# Patient Record
Sex: MALE | Race: WHITE | NOT HISPANIC OR LATINO | Employment: FULL TIME | URBAN - METROPOLITAN AREA
[De-identification: names, ages, dates, MRNs, and addresses within clinical notes are randomized per-mention and may not be internally consistent; named-entity substitution may affect disease eponyms.]

---

## 2024-03-06 ENCOUNTER — HOSPITAL ENCOUNTER (OUTPATIENT)
Facility: HOSPITAL | Age: 64
Discharge: HOME | End: 2024-03-08
Attending: EMERGENCY MEDICINE | Admitting: HOSPITALIST
Payer: COMMERCIAL

## 2024-03-06 DIAGNOSIS — E66.01 MORBID OBESITY: ICD-10-CM

## 2024-03-06 DIAGNOSIS — R42 VERTIGO: ICD-10-CM

## 2024-03-06 DIAGNOSIS — R07.9 CHEST PAIN: ICD-10-CM

## 2024-03-06 DIAGNOSIS — R42 DIZZINESS: ICD-10-CM

## 2024-03-06 DIAGNOSIS — R11.2 INTRACTABLE NAUSEA AND VOMITING: Primary | ICD-10-CM

## 2024-03-06 LAB
ALBUMIN SERPL BCP-MCNC: 3.9 G/DL (ref 3.5–5.2)
ALP SERPL-CCNC: 62 U/L (ref 55–135)
ALT SERPL W/O P-5'-P-CCNC: 26 U/L (ref 10–44)
ANION GAP SERPL CALC-SCNC: 9 MMOL/L (ref 8–16)
AST SERPL-CCNC: 26 U/L (ref 10–40)
BASOPHILS # BLD AUTO: 0.02 K/UL (ref 0–0.2)
BASOPHILS NFR BLD: 0.3 % (ref 0–1.9)
BILIRUB SERPL-MCNC: 0.7 MG/DL (ref 0.1–1)
BUN SERPL-MCNC: 13 MG/DL (ref 8–23)
CALCIUM SERPL-MCNC: 8.8 MG/DL (ref 8.7–10.5)
CHLORIDE SERPL-SCNC: 108 MMOL/L (ref 95–110)
CO2 SERPL-SCNC: 22 MMOL/L (ref 23–29)
CREAT SERPL-MCNC: 1 MG/DL (ref 0.5–1.4)
DIFFERENTIAL METHOD BLD: ABNORMAL
EOSINOPHIL # BLD AUTO: 0 K/UL (ref 0–0.5)
EOSINOPHIL NFR BLD: 0.3 % (ref 0–8)
ERYTHROCYTE [DISTWIDTH] IN BLOOD BY AUTOMATED COUNT: 12.2 % (ref 11.5–14.5)
EST. GFR  (NO RACE VARIABLE): >60 ML/MIN/1.73 M^2
GLUCOSE SERPL-MCNC: 125 MG/DL (ref 70–110)
HCT VFR BLD AUTO: 45.1 % (ref 40–54)
HGB BLD-MCNC: 15.8 G/DL (ref 14–18)
IMM GRANULOCYTES # BLD AUTO: 0.02 K/UL (ref 0–0.04)
IMM GRANULOCYTES NFR BLD AUTO: 0.3 % (ref 0–0.5)
LYMPHOCYTES # BLD AUTO: 0.4 K/UL (ref 1–4.8)
LYMPHOCYTES NFR BLD: 7.2 % (ref 18–48)
MCH RBC QN AUTO: 31.5 PG (ref 27–31)
MCHC RBC AUTO-ENTMCNC: 35 G/DL (ref 32–36)
MCV RBC AUTO: 90 FL (ref 82–98)
MONOCYTES # BLD AUTO: 0.2 K/UL (ref 0.3–1)
MONOCYTES NFR BLD: 3 % (ref 4–15)
NEUTROPHILS # BLD AUTO: 5.3 K/UL (ref 1.8–7.7)
NEUTROPHILS NFR BLD: 88.9 % (ref 38–73)
NRBC BLD-RTO: 0 /100 WBC
PLATELET # BLD AUTO: 165 K/UL (ref 150–450)
PMV BLD AUTO: 9.2 FL (ref 9.2–12.9)
POCT GLUCOSE: 114 MG/DL (ref 70–110)
POTASSIUM SERPL-SCNC: 4.3 MMOL/L (ref 3.5–5.1)
PROT SERPL-MCNC: 7.2 G/DL (ref 6–8.4)
RBC # BLD AUTO: 5.01 M/UL (ref 4.6–6.2)
SODIUM SERPL-SCNC: 139 MMOL/L (ref 136–145)
TROPONIN I SERPL DL<=0.01 NG/ML-MCNC: <0.006 NG/ML (ref 0–0.03)
WBC # BLD AUTO: 5.94 K/UL (ref 3.9–12.7)

## 2024-03-06 PROCEDURE — G0378 HOSPITAL OBSERVATION PER HR: HCPCS

## 2024-03-06 PROCEDURE — 96365 THER/PROPH/DIAG IV INF INIT: CPT

## 2024-03-06 PROCEDURE — 99285 EMERGENCY DEPT VISIT HI MDM: CPT | Mod: 25

## 2024-03-06 PROCEDURE — 84484 ASSAY OF TROPONIN QUANT: CPT | Performed by: EMERGENCY MEDICINE

## 2024-03-06 PROCEDURE — 80053 COMPREHEN METABOLIC PANEL: CPT | Performed by: EMERGENCY MEDICINE

## 2024-03-06 PROCEDURE — 85025 COMPLETE CBC W/AUTO DIFF WBC: CPT | Performed by: EMERGENCY MEDICINE

## 2024-03-06 PROCEDURE — 82962 GLUCOSE BLOOD TEST: CPT

## 2024-03-06 PROCEDURE — 93005 ELECTROCARDIOGRAM TRACING: CPT

## 2024-03-06 PROCEDURE — 63600175 PHARM REV CODE 636 W HCPCS: Performed by: EMERGENCY MEDICINE

## 2024-03-06 PROCEDURE — 96375 TX/PRO/DX INJ NEW DRUG ADDON: CPT

## 2024-03-06 PROCEDURE — 96361 HYDRATE IV INFUSION ADD-ON: CPT

## 2024-03-06 PROCEDURE — 93010 ELECTROCARDIOGRAM REPORT: CPT | Mod: ,,, | Performed by: INTERNAL MEDICINE

## 2024-03-06 PROCEDURE — 25000003 PHARM REV CODE 250: Performed by: EMERGENCY MEDICINE

## 2024-03-06 RX ORDER — LOSARTAN POTASSIUM 50 MG/1
TABLET ORAL
COMMUNITY

## 2024-03-06 RX ORDER — MECLIZINE HYDROCHLORIDE 25 MG/1
25 TABLET ORAL
Status: COMPLETED | OUTPATIENT
Start: 2024-03-06 | End: 2024-03-06

## 2024-03-06 RX ORDER — SODIUM CHLORIDE 0.9 % (FLUSH) 0.9 %
3 SYRINGE (ML) INJECTION EVERY 12 HOURS PRN
Status: DISCONTINUED | OUTPATIENT
Start: 2024-03-06 | End: 2024-03-08 | Stop reason: HOSPADM

## 2024-03-06 RX ORDER — ALUMINUM HYDROXIDE, MAGNESIUM HYDROXIDE, AND SIMETHICONE 1200; 120; 1200 MG/30ML; MG/30ML; MG/30ML
30 SUSPENSION ORAL 4 TIMES DAILY PRN
Status: DISCONTINUED | OUTPATIENT
Start: 2024-03-06 | End: 2024-03-08 | Stop reason: HOSPADM

## 2024-03-06 RX ORDER — ALBUTEROL SULFATE 0.83 MG/ML
SOLUTION RESPIRATORY (INHALATION)
COMMUNITY

## 2024-03-06 RX ORDER — LEVOTHYROXINE SODIUM 150 UG/1
150 TABLET ORAL
Status: DISCONTINUED | OUTPATIENT
Start: 2024-03-07 | End: 2024-03-08 | Stop reason: HOSPADM

## 2024-03-06 RX ORDER — SIMETHICONE 80 MG
1 TABLET,CHEWABLE ORAL 4 TIMES DAILY PRN
Status: DISCONTINUED | OUTPATIENT
Start: 2024-03-06 | End: 2024-03-08 | Stop reason: HOSPADM

## 2024-03-06 RX ORDER — GLUCAGON 1 MG
1 KIT INJECTION
Status: DISCONTINUED | OUTPATIENT
Start: 2024-03-06 | End: 2024-03-08 | Stop reason: HOSPADM

## 2024-03-06 RX ORDER — ATORVASTATIN CALCIUM 20 MG/1
TABLET, FILM COATED ORAL
COMMUNITY

## 2024-03-06 RX ORDER — ATORVASTATIN CALCIUM 10 MG/1
20 TABLET, FILM COATED ORAL NIGHTLY
Status: DISCONTINUED | OUTPATIENT
Start: 2024-03-07 | End: 2024-03-08 | Stop reason: HOSPADM

## 2024-03-06 RX ORDER — ACETAMINOPHEN 650 MG/1
650 SUPPOSITORY RECTAL EVERY 4 HOURS PRN
Status: DISCONTINUED | OUTPATIENT
Start: 2024-03-06 | End: 2024-03-08 | Stop reason: HOSPADM

## 2024-03-06 RX ORDER — TALC
6 POWDER (GRAM) TOPICAL NIGHTLY PRN
Status: DISCONTINUED | OUTPATIENT
Start: 2024-03-06 | End: 2024-03-08 | Stop reason: HOSPADM

## 2024-03-06 RX ORDER — ACETAMINOPHEN 325 MG/1
650 TABLET ORAL EVERY 6 HOURS PRN
Status: DISCONTINUED | OUTPATIENT
Start: 2024-03-06 | End: 2024-03-08 | Stop reason: HOSPADM

## 2024-03-06 RX ORDER — NALOXONE HCL 0.4 MG/ML
0.02 VIAL (ML) INJECTION
Status: DISCONTINUED | OUTPATIENT
Start: 2024-03-06 | End: 2024-03-08 | Stop reason: HOSPADM

## 2024-03-06 RX ORDER — ONDANSETRON HYDROCHLORIDE 2 MG/ML
4 INJECTION, SOLUTION INTRAVENOUS EVERY 6 HOURS PRN
Status: DISCONTINUED | OUTPATIENT
Start: 2024-03-06 | End: 2024-03-08 | Stop reason: HOSPADM

## 2024-03-06 RX ORDER — IBUPROFEN 200 MG
24 TABLET ORAL
Status: DISCONTINUED | OUTPATIENT
Start: 2024-03-06 | End: 2024-03-08 | Stop reason: HOSPADM

## 2024-03-06 RX ORDER — IBUPROFEN 200 MG
16 TABLET ORAL
Status: DISCONTINUED | OUTPATIENT
Start: 2024-03-06 | End: 2024-03-08 | Stop reason: HOSPADM

## 2024-03-06 RX ORDER — PROMETHAZINE HYDROCHLORIDE 25 MG/1
25 TABLET ORAL EVERY 6 HOURS PRN
Status: DISCONTINUED | OUTPATIENT
Start: 2024-03-06 | End: 2024-03-08 | Stop reason: HOSPADM

## 2024-03-06 RX ORDER — LEVOTHYROXINE SODIUM 150 UG/1
TABLET ORAL
COMMUNITY
Start: 2024-01-07

## 2024-03-06 RX ORDER — METHYLPREDNISOLONE SOD SUCC 125 MG
125 VIAL (EA) INJECTION
Status: COMPLETED | OUTPATIENT
Start: 2024-03-06 | End: 2024-03-06

## 2024-03-06 RX ORDER — POLYETHYLENE GLYCOL 3350 17 G/17G
17 POWDER, FOR SOLUTION ORAL DAILY PRN
Status: DISCONTINUED | OUTPATIENT
Start: 2024-03-06 | End: 2024-03-08 | Stop reason: HOSPADM

## 2024-03-06 RX ORDER — LOSARTAN POTASSIUM 50 MG/1
50 TABLET ORAL DAILY
Status: DISCONTINUED | OUTPATIENT
Start: 2024-03-07 | End: 2024-03-08 | Stop reason: HOSPADM

## 2024-03-06 RX ADMIN — MECLIZINE HYDROCHLORIDE 25 MG: 25 TABLET ORAL at 03:03

## 2024-03-06 RX ADMIN — METHYLPREDNISOLONE SODIUM SUCCINATE 125 MG: 125 INJECTION, POWDER, FOR SOLUTION INTRAMUSCULAR; INTRAVENOUS at 06:03

## 2024-03-06 RX ADMIN — PROMETHAZINE HYDROCHLORIDE 25 MG: 25 INJECTION INTRAMUSCULAR; INTRAVENOUS at 05:03

## 2024-03-06 RX ADMIN — SODIUM CHLORIDE 1000 ML: 9 INJECTION, SOLUTION INTRAVENOUS at 05:03

## 2024-03-06 NOTE — ED NOTES
"Patient attempted to stand for orthostatic vitals, Patient stated "I cannot stand I get to dizzy"  "

## 2024-03-06 NOTE — ED PROVIDER NOTES
SCRIBE #1 NOTE: I, Rufino Vides, am scribing for, and in the presence of, Ingrid Vidal MD. I have scribed the entire note.       History     Chief Complaint   Patient presents with    Dizziness     Pt. Presents to ED via EMS due to being DX with vertigo this morning at , and having vomiting with any movement      Review of patient's allergies indicates:  No Known Allergies      History of Present Illness     HPI    3/6/2024, 1:27 PM  History obtained from the patient and EMS      History of Present Illness: Kamar Loya is a 63 y.o. male patient with a PMHx of hypothyroidism who presents to the Emergency Department for evaluation of dizziness which onset last night. Associated sxs include N/V and tinnitus. He notes that the vomiting worsens with movement. Patient denies any CP, SOB, HA, vision changes, and all other sxs at this time. He was treated at  earlier today with meclizine and zofran with some improvement with some improvement but was referred to the ED. Shelli, pt received 2.5 mg droperidol, zofran, and NS with improvement; he denied nausea on arrival. He reports a hx of vertigo with similar sxs when he was in college. 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 No further complaints or concerns at this time.       Arrival mode: EMS    PCP: No primary care provider on file.        Past Medical History:  Past Medical History:   Diagnosis Date    Hypertension     Hypothyroidism, unspecified        Past Surgical History:  History reviewed. No pertinent surgical history.      Family History:  History reviewed. No pertinent family history.    Social History:  Social History     Tobacco Use    Smoking status: Never    Smokeless tobacco: Never   Substance and Sexual Activity    Alcohol use: Never    Drug use: Never    Sexual activity: Not on file        Review of Systems     Review of Systems   Constitutional:  Negative for fever.   HENT:  Positive for tinnitus. Negative for sore throat.    Eyes:  Negative for visual  disturbance.   Respiratory:  Negative for shortness of breath.    Cardiovascular:  Negative for chest pain.   Gastrointestinal:  Positive for nausea and vomiting.   Genitourinary:  Negative for dysuria.   Musculoskeletal:  Positive for gait problem. Negative for back pain.   Skin:  Negative for rash.   Neurological:  Positive for dizziness. Negative for weakness and headaches.   Hematological:  Does not bruise/bleed easily.   All other systems reviewed and are negative.     Physical Exam     Initial Vitals [03/06/24 1330]   BP Pulse Resp Temp SpO2   123/83 74 16 98.4 °F (36.9 °C) 98 %      MAP       --          Physical Exam  Nursing Notes and Vital Signs Reviewed.  Constitutional: Patient is in no acute distress. Well-developed and well-nourished.  Head: Atraumatic. Normocephalic.  Eyes: R pupil RRL. EOM intact. L eye prosthesis. Conjunctivae are not pale. No scleral icterus.  ENT: Mucous membranes are moist. Oropharynx is clear and symmetric.    Neck: Supple. Full ROM. No lymphadenopathy.  Cardiovascular: Regular rate. Regular rhythm. No murmurs, rubs, or gallops. Distal pulses are 2+ and symmetric.  Pulmonary/Chest: No respiratory distress. Clear to auscultation bilaterally. No wheezing or rales.  Abdominal: Soft and non-distended.  There is no tenderness.  No rebound, guarding, or rigidity. Good bowel sounds.  Genitourinary: No CVA tenderness  Musculoskeletal: Moves all extremities. No obvious deformities. No edema. No calf tenderness.  Skin: Warm and dry.  Neurological:  Alert, awake, and appropriate.  Normal speech.  No acute focal neurological deficits are appreciated.  Psychiatric: Normal affect. Good eye contact. Appropriate in content.     ED Course   Procedures  ED Vital Signs:  Vitals:    03/07/24 0438 03/07/24 0757 03/07/24 0809 03/07/24 0907   BP: 130/72  (!) 141/78 (!) 141/78   Pulse: 81 82 76    Resp: 17  18    Temp: 97.5 °F (36.4 °C)  97.7 °F (36.5 °C)    TempSrc: Oral      SpO2: (!) 94%  (!) 93%     Weight:       Height:        03/07/24 1126 03/07/24 1640 03/07/24 2000 03/07/24 2049   BP: (!) 169/80 136/73  (!) 149/85   Pulse: 73 82 74 66   Resp: 20 19  18   Temp: 98.2 °F (36.8 °C) 98.1 °F (36.7 °C)  98.3 °F (36.8 °C)   TempSrc: Oral Oral  Oral   SpO2: (!) 93% (!) 92%  (!) 93%   Weight:       Height:        03/08/24 0014 03/08/24 0032 03/08/24 0335 03/08/24 0400   BP: 113/61  114/66    Pulse: 71  64 61   Resp: 18  17    Temp: 98 °F (36.7 °C)      TempSrc: Oral  Oral    SpO2: (!) 91% (!) 94% (!) 93%    Weight:       Height:        03/08/24 0737 03/08/24 0820 03/08/24 0908   BP: (!) 140/78  (!) 140/78   Pulse: 75 70    Resp: 12     Temp: 97.6 °F (36.4 °C)     TempSrc: Oral     SpO2: 95%     Weight:      Height:          Abnormal Lab Results:  Labs Reviewed   CBC W/ AUTO DIFFERENTIAL - Abnormal; Notable for the following components:       Result Value    MCH 31.5 (*)     Lymph # 0.4 (*)     Mono # 0.2 (*)     Gran % 88.9 (*)     Lymph % 7.2 (*)     Mono % 3.0 (*)     All other components within normal limits   COMPREHENSIVE METABOLIC PANEL - Abnormal; Notable for the following components:    CO2 22 (*)     Glucose 125 (*)     All other components within normal limits   POCT GLUCOSE - Abnormal; Notable for the following components:    POCT Glucose 114 (*)     All other components within normal limits   TROPONIN I        All Lab Results:  Results for orders placed or performed during the hospital encounter of 03/06/24   CBC auto differential   Result Value Ref Range    WBC 5.94 3.90 - 12.70 K/uL    RBC 5.01 4.60 - 6.20 M/uL    Hemoglobin 15.8 14.0 - 18.0 g/dL    Hematocrit 45.1 40.0 - 54.0 %    MCV 90 82 - 98 fL    MCH 31.5 (H) 27.0 - 31.0 pg    MCHC 35.0 32.0 - 36.0 g/dL    RDW 12.2 11.5 - 14.5 %    Platelets 165 150 - 450 K/uL    MPV 9.2 9.2 - 12.9 fL    Immature Granulocytes 0.3 0.0 - 0.5 %    Gran # (ANC) 5.3 1.8 - 7.7 K/uL    Immature Grans (Abs) 0.02 0.00 - 0.04 K/uL    Lymph # 0.4 (L) 1.0 - 4.8 K/uL    Mono  # 0.2 (L) 0.3 - 1.0 K/uL    Eos # 0.0 0.0 - 0.5 K/uL    Baso # 0.02 0.00 - 0.20 K/uL    nRBC 0 0 /100 WBC    Gran % 88.9 (H) 38.0 - 73.0 %    Lymph % 7.2 (L) 18.0 - 48.0 %    Mono % 3.0 (L) 4.0 - 15.0 %    Eosinophil % 0.3 0.0 - 8.0 %    Basophil % 0.3 0.0 - 1.9 %    Differential Method Automated    Comprehensive metabolic panel   Result Value Ref Range    Sodium 139 136 - 145 mmol/L    Potassium 4.3 3.5 - 5.1 mmol/L    Chloride 108 95 - 110 mmol/L    CO2 22 (L) 23 - 29 mmol/L    Glucose 125 (H) 70 - 110 mg/dL    BUN 13 8 - 23 mg/dL    Creatinine 1.0 0.5 - 1.4 mg/dL    Calcium 8.8 8.7 - 10.5 mg/dL    Total Protein 7.2 6.0 - 8.4 g/dL    Albumin 3.9 3.5 - 5.2 g/dL    Total Bilirubin 0.7 0.1 - 1.0 mg/dL    Alkaline Phosphatase 62 55 - 135 U/L    AST 26 10 - 40 U/L    ALT 26 10 - 44 U/L    eGFR >60 >60 mL/min/1.73 m^2    Anion Gap 9 8 - 16 mmol/L   Troponin I #1   Result Value Ref Range    Troponin I <0.006 0.000 - 0.026 ng/mL   Vitamin B12   Result Value Ref Range    Vitamin B-12 418 210 - 950 pg/mL   Hemoglobin A1c   Result Value Ref Range    Hemoglobin A1C 5.6 4.0 - 5.6 %    Estimated Avg Glucose 114 68 - 131 mg/dL   TSH   Result Value Ref Range    TSH 0.451 0.400 - 4.000 uIU/mL   T4, free   Result Value Ref Range    Free T4 1.06 0.71 - 1.51 ng/dL   CBC Auto Differential   Result Value Ref Range    WBC 6.95 3.90 - 12.70 K/uL    RBC 4.65 4.60 - 6.20 M/uL    Hemoglobin 14.7 14.0 - 18.0 g/dL    Hematocrit 43.0 40.0 - 54.0 %    MCV 93 82 - 98 fL    MCH 31.6 (H) 27.0 - 31.0 pg    MCHC 34.2 32.0 - 36.0 g/dL    RDW 12.4 11.5 - 14.5 %    Platelets 186 150 - 450 K/uL    MPV 9.3 9.2 - 12.9 fL    Immature Granulocytes 0.3 0.0 - 0.5 %    Gran # (ANC) 5.1 1.8 - 7.7 K/uL    Immature Grans (Abs) 0.02 0.00 - 0.04 K/uL    Lymph # 1.3 1.0 - 4.8 K/uL    Mono # 0.5 0.3 - 1.0 K/uL    Eos # 0.0 0.0 - 0.5 K/uL    Baso # 0.04 0.00 - 0.20 K/uL    nRBC 0 0 /100 WBC    Gran % 73.0 38.0 - 73.0 %    Lymph % 19.3 18.0 - 48.0 %    Mono % 6.5 4.0 -  15.0 %    Eosinophil % 0.3 0.0 - 8.0 %    Basophil % 0.6 0.0 - 1.9 %    Differential Method Automated    Basic Metabolic Panel   Result Value Ref Range    Sodium 139 136 - 145 mmol/L    Potassium 4.1 3.5 - 5.1 mmol/L    Chloride 107 95 - 110 mmol/L    CO2 25 23 - 29 mmol/L    Glucose 118 (H) 70 - 110 mg/dL    BUN 16 8 - 23 mg/dL    Creatinine 1.1 0.5 - 1.4 mg/dL    Calcium 8.8 8.7 - 10.5 mg/dL    Anion Gap 7 (L) 8 - 16 mmol/L    eGFR >60 >60 mL/min/1.73 m^2   EKG 12-lead   Result Value Ref Range    QRS Duration 98 ms    [facility] QTC Calculation 448 ms   POCT glucose   Result Value Ref Range    POCT Glucose 114 (H) 70 - 110 mg/dL        Imaging Results:  Imaging Results              MRI Brain Without Contrast (Final result)  Result time 03/06/24 21:03:29      Final result by Aggie Chauhan MD (03/06/24 21:03:29)                   Impression:      no acute intracranial finding      Electronically signed by: Aggie Chauhan  Date:    03/06/2024  Time:    21:03               Narrative:    EXAMINATION:  MRI BRAIN WITHOUT CONTRAST    CLINICAL HISTORY:  Dizziness, persistent/recurrent, cardiac or vascular cause suspected;    TECHNIQUE:  Multiplanar multisequence MR imaging of the brain was performed without contrast.    COMPARISON:  Head CT correlation    FINDINGS:  No acute ischemia    No mass effect or midline shift.    No hydrocephalus.    Right mastoid air cell effusion    Major vascular flow voids present    Sinus opacity redemonstrated                                       CT Head Without Contrast (Final result)  Result time 03/06/24 17:11:26      Final result by Aggie Chauhan MD (03/06/24 17:11:26)                   Impression:      No acute intracranial finding      Electronically signed by: Aggie Chauhan  Date:    03/06/2024  Time:    17:11               Narrative:    EXAMINATION:  CT HEAD WITHOUT CONTRAST    CLINICAL HISTORY:  Dizziness, persistent/recurrent, cardiac or vascular cause  suspected;    TECHNIQUE:  Low dose axial images were obtained through the head.  Coronal and sagittal reformations were also performed. Contrast was not administered.    COMPARISON:  None available    FINDINGS:  No acute intracranial hemorrhage or mass effect.  Ventricles nondistended.  No displaced skull fracture.  Mastoid effusion right.  Maxillary sinus opacity bilateral appears chronic                                       The EKG was ordered, reviewed, and independently interpreted by the ED provider.  Interpretation time: 16:03  Rate: 66 BPM  Rhythm: normal sinus rhythm  Interpretation: Left axis deviation. Incomplete right bundle branch block. No STEMI.             The Emergency Provider reviewed the vital signs and test results, which are outlined above.     ED Discussion       7:10 PM: Discussed case with Marylu Adames MD ([facility]). Dr. Mina agrees with current care and management of pt and accepts admission.   Admitting Service: Hospital Medicine  Admitting Physician: Dr. Mina  Admit to: Obs Med/Tele    7:10 PM: Re-evaluated pt. I have discussed test results, shared treatment plan, and the need for admission with patient and family at bedside. Pt and family express understanding at this time and agree with all information. All questions answered. Pt and family have no further questions or concerns at this time. Pt is ready for admit.         Medical Decision Making  DDX:  1. Vertigo  2. BPPV  3. Stroke      Presents with sudden onset vertigo, nausea vomiting, unable to stand, neuro exam otherwise normal, CT head negative, ECG no ischemic changes, lab work reviewed and otherwise normal, patient given meclizine, phenergan and iv fluids, he remains symptomatic and unable to stand, hosp med consulted for admission.     Amount and/or Complexity of Data Reviewed  Labs: ordered. Decision-making details documented in ED Course.  Radiology: ordered. Decision-making details documented in ED  Course.  ECG/medicine tests: ordered and independent interpretation performed. Decision-making details documented in ED Course.    Risk  Prescription drug management.  Decision regarding hospitalization.                ED Medication(s):  Medications   meclizine tablet 25 mg (25 mg Oral Given 3/6/24 1544)   promethazine (PHENERGAN) 25 mg in dextrose 5 % (D5W) 50 mL IVPB (0 mg Intravenous Stopped 3/6/24 1733)   sodium chloride 0.9% bolus 1,000 mL 1,000 mL (0 mLs Intravenous Stopped 3/6/24 1813)   methylPREDNISolone sodium succinate injection 125 mg (125 mg Intravenous Given by Other 3/6/24 1815)   ondansetron disintegrating tablet 4 mg (4 mg Oral Given 3/8/24 0008)   clonazePAM tablet 0.5 mg (0.5 mg Oral Given 3/7/24 1241)       Discharge Medication List as of 3/8/2024 12:38 PM        START taking these medications    Details   clonazePAM (KLONOPIN) 0.5 MG tablet Take 1 tablet (0.5 mg total) by mouth 2 (two) times daily as needed (vertigo)., Starting Fri 3/8/2024, Until Sat 3/8/2025 at 2359, Normal      ondansetron (ZOFRAN-ODT) 4 MG TbDL Dissolve 2 tablets (8 mg total) by mouth every 6 (six) hours as needed (nausea)., Starting Fri 3/8/2024, Normal              Follow-up Information       [facility] Follow up.                                 Scribe Attestation:   Scribe #1: I performed the above scribed service and the documentation accurately describes the services I performed. I attest to the accuracy of the note.     Attending:   Physician Attestation Statement for Scribe #1: I, Ingrid Vidal MD, personally performed the services described in this documentation, as scribed by Rufino Vides, in my presence, and it is both accurate and complete.           Clinical Impression       ICD-10-CM ICD-9-CM   1. Intractable nausea and vomiting  R11.2 536.2   2. Dizziness  R42 780.4   3. Morbid obesity  E66.01 278.01   4. Chest pain  R07.9 786.50   5. Vertigo  R42 780.4       Disposition:   Disposition:  Placed in Observation  Condition: Ingrid Sanchez MD  03/09/24 1501

## 2024-03-07 PROBLEM — I10 HYPERTENSION: Status: ACTIVE | Noted: 2024-03-07

## 2024-03-07 PROBLEM — E03.9 HYPOTHYROIDISM: Status: ACTIVE | Noted: 2024-03-07

## 2024-03-07 PROBLEM — H61.23 IMPACTED CERUMEN OF BOTH EARS: Status: ACTIVE | Noted: 2024-03-07

## 2024-03-07 PROBLEM — R42 DIZZINESS: Status: ACTIVE | Noted: 2024-03-07

## 2024-03-07 LAB
ESTIMATED AVG GLUCOSE: 114 MG/DL (ref 68–131)
HBA1C MFR BLD: 5.6 % (ref 4–5.6)
T4 FREE SERPL-MCNC: 1.06 NG/DL (ref 0.71–1.51)
TSH SERPL DL<=0.005 MIU/L-ACNC: 0.45 UIU/ML (ref 0.4–4)
VIT B12 SERPL-MCNC: 418 PG/ML (ref 210–950)

## 2024-03-07 PROCEDURE — 84443 ASSAY THYROID STIM HORMONE: CPT | Performed by: INTERNAL MEDICINE

## 2024-03-07 PROCEDURE — 83036 HEMOGLOBIN GLYCOSYLATED A1C: CPT | Performed by: INTERNAL MEDICINE

## 2024-03-07 PROCEDURE — 36415 COLL VENOUS BLD VENIPUNCTURE: CPT | Performed by: INTERNAL MEDICINE

## 2024-03-07 PROCEDURE — 97165 OT EVAL LOW COMPLEX 30 MIN: CPT

## 2024-03-07 PROCEDURE — 97116 GAIT TRAINING THERAPY: CPT

## 2024-03-07 PROCEDURE — 97530 THERAPEUTIC ACTIVITIES: CPT | Mod: 59

## 2024-03-07 PROCEDURE — 82607 VITAMIN B-12: CPT | Performed by: INTERNAL MEDICINE

## 2024-03-07 PROCEDURE — 95992 CANALITH REPOSITIONING PROC: CPT

## 2024-03-07 PROCEDURE — G0378 HOSPITAL OBSERVATION PER HR: HCPCS

## 2024-03-07 PROCEDURE — 25000003 PHARM REV CODE 250: Performed by: NURSE PRACTITIONER

## 2024-03-07 PROCEDURE — 84439 ASSAY OF FREE THYROXINE: CPT | Performed by: INTERNAL MEDICINE

## 2024-03-07 PROCEDURE — 97530 THERAPEUTIC ACTIVITIES: CPT

## 2024-03-07 PROCEDURE — 97162 PT EVAL MOD COMPLEX 30 MIN: CPT

## 2024-03-07 RX ORDER — CLONAZEPAM 0.5 MG/1
0.5 TABLET ORAL 2 TIMES DAILY PRN
Status: DISCONTINUED | OUTPATIENT
Start: 2024-03-07 | End: 2024-03-08 | Stop reason: HOSPADM

## 2024-03-07 RX ORDER — ONDANSETRON 4 MG/1
4 TABLET, ORALLY DISINTEGRATING ORAL EVERY 6 HOURS
Status: COMPLETED | OUTPATIENT
Start: 2024-03-07 | End: 2024-03-08

## 2024-03-07 RX ORDER — CLONAZEPAM 0.5 MG/1
0.5 TABLET ORAL ONCE
Status: COMPLETED | OUTPATIENT
Start: 2024-03-07 | End: 2024-03-07

## 2024-03-07 RX ADMIN — LEVOTHYROXINE SODIUM 150 MCG: 150 TABLET ORAL at 06:03

## 2024-03-07 RX ADMIN — ONDANSETRON 4 MG: 4 TABLET, ORALLY DISINTEGRATING ORAL at 06:03

## 2024-03-07 RX ADMIN — LOSARTAN POTASSIUM 50 MG: 50 TABLET, FILM COATED ORAL at 09:03

## 2024-03-07 RX ADMIN — ACETAMINOPHEN 650 MG: 325 TABLET ORAL at 08:03

## 2024-03-07 RX ADMIN — Medication 6 MG: at 08:03

## 2024-03-07 RX ADMIN — CLONAZEPAM 0.5 MG: 0.5 TABLET ORAL at 12:03

## 2024-03-07 RX ADMIN — ATORVASTATIN CALCIUM 20 MG: 10 TABLET, FILM COATED ORAL at 08:03

## 2024-03-07 RX ADMIN — ONDANSETRON 4 MG: 4 TABLET, ORALLY DISINTEGRATING ORAL at 12:03

## 2024-03-07 NOTE — PT/OT/SLP EVAL
Occupational Therapy Evaluation and Treatment    Name: Kamar Loya  MRN: [id number]  Admitting Diagnosis: Dizziness  Recent Surgery: * No surgery found *      Recommendations:     Discharge Recommendations: Low Intensity Therapy  Level of Assistance Recommended: 24 hours light assistance  Discharge Equipment Recommendations: to be determined by next level of care  Barriers to discharge:      Assessment:     Kamar Loya is a 63 y.o. male with a medical diagnosis of Dizziness. He presents with performance deficits affecting function including weakness, impaired functional mobility, decreased safety awareness, impaired endurance, decreased coordination, gait instability, impaired balance, impaired self care skills, decreased lower extremity function, impaired coordination, visual deficits.     Rehab Prognosis: Good; patient would benefit from acute OT services to address these deficits and reach maximum level of function.    Plan:     Patient to be seen   to address the above listed problems via self-care/home management, therapeutic activities, therapeutic exercises  Plan of Care Expires: 03/21/24  Plan of Care Reviewed with: patient    Subjective     Chief Complaint: DEBILITY AND GENERALIZED WEAKNESS  Patient Comments/Goals:   Pain/Comfort:  Pain Rating 1: 0/10    Patients cultural, spiritual, Catholic conflicts given the current situation:      Social History:  Living Environment: Patient lives alone in a single story home with number of outside stair(s): 0  Prior Level of Function: Prior to admission, patient was independent  Roles and Routines: Patient was driving and working prior to admission.  Equipment Used at Home: none  DME owned (not currently used): none  Assistance Upon Discharge: unknown    Objective:     Communicated with NURSE AND EPIC CHART REVIEW prior to session. Patient found up in chair with peripheral IV upon OT entry to room.    General Precautions: Standard, fall   Orthopedic  Precautions: N/A   Braces: N/A    Respiratory Status: Room air    Occupational Performance    Gait belt applied - Yes    Bed Mobility:   Rolling/Turning to Left with stand by assistance  Rolling/Turning to Right with stand by assistance  Scooting anteriorly to EOB to have both feet planted on floor: stand by assistance  Supine to sit from right side of bed with stand by assistance  Supine to sit from left side of bed with stand by assistance    Functional Mobility/Transfers:  Sit <> Stand Transfer with minimum assistance with rolling walker  Bed <> Chair Transfer using Step Transfer technique with minimum assistance with rolling walker  Functional Mobility: PT AMBULATED 18 FEET WITH ROLLING WALKER MIN A     Activities of Daily Living:  Upper Body Dressing: minimum assistance  Lower Body Dressing: maximal assistance DUE TO PROTOCOL    Cognitive/Visual Perceptual:  Cognitive/Psychosocial Skills:    -     Oriented to: Person, Place, Time, Situation  -     Follows Commands/attention: Follows multistep  commands  -     Communication: clear/fluent  -     Memory: No Deficits noted  -     Safety awareness/insight to disability: impaired    Physical Exam:  Upper Extremity Range of Motion:     -       Right Upper Extremity: WFL  -       Left Upper Extremity: WFL  Upper Extremity Strength:    -       Right Upper Extremity: MMT: 4/5 GROSSLY  -       Left Upper Extremity: MMT: 4/5 GROSSLY   Strength:    -       Right Upper Extremity: WFL  -       Left Upper Extremity: WFL    AMPAC 6 Click ADL:  AMPAC Total Score: 20    Treatment & Education:  Educated patient on importance of increased tolerance to upright position and direct impact on CV endurance and strength. Patient encouraged to sit up in chair/ EOB, for a minimum of 2 consecutive hours including for all meals.. Encouraged patient to perform AROM TE to BUE throughout the day within all available planes of motion. Re enforced importance of utilizing call light to meet  needs in room and not attempt to get up without staff assistance. Patient verbalized understanding and agreed to comply.           Patient clear to stand pivot transfer with RN/PCT, assist xSTEP <PIV OT TRANSFER .    Patient left up in chair with all lines intact, call button in reach, RN notified, and chair alarm on.    GOALS:   Multidisciplinary Problems       Occupational Therapy Goals          Problem: Occupational Therapy    Goal Priority Disciplines Outcome Interventions   Occupational Therapy Goal     OT, PT/OT     Description: O.T. GOALS TO BE MET BY 3-20-24  PT TOLERATED 1 SET X 15 REPS B UE ROM EXERCISE  S WITH LE DRESSING  S WITH UE DRESSING   S WITH TOILET TRANSFER                       History:     Past Medical History:   Diagnosis Date    Hypertension     Hypothyroidism, unspecified        History reviewed. No pertinent surgical history.    Time Tracking:     OT Date of Treatment: 03/07/24  OT Start Time: 1050  OT Stop Time: 1120  OT Total Time (min): 30 min    Billable Minutes: Evaluation 15 MINUTES and Therapeutic Activity 15 MINUTES    3/7/2024

## 2024-03-07 NOTE — PLAN OF CARE
PT TOLERATED EPLEY MANEUVER WITH CONT DIZZINESS AND NAUSEA. PT GT TRAINED X 18' WITH RW AND MIN A.

## 2024-03-07 NOTE — ASSESSMENT & PLAN NOTE
Advanced imaging negative for posterior stroke. Some improvement with meclizine at outside facility and with removal of impacted cerumen.   Plan:  -IVFs prn  -US carotids  -Hold off on additional doses of meclizine until evaluated by PT/OT  -change positions slowly  -OP ENT f/u for further eval/treatment of peripheral vs central causes of dizziness    3/7/24  Carotid doppler negative.  Discussed with ENT who recommended trial of benzodiazepine and continue Epley maneuver via PT/TO  Discussed with patient he will not be able to drive home  at discharge.

## 2024-03-07 NOTE — ASSESSMENT & PLAN NOTE
Chronic, controlled. Latest blood pressure and vitals reviewed-     Temp:  [97.5 °F (36.4 °C)-98.7 °F (37.1 °C)]   Pulse:  [62-75]   Resp:  [16-20]   BP: (118-151)/(74-88)   SpO2:  [93 %-98 %] .   Home meds for hypertension were reviewed and noted below.   Hypertension Medications               losartan (COZAAR) 50 MG tablet             While in the hospital, will manage blood pressure as follows; Continue home antihypertensive regimen    Will utilize p.r.n. blood pressure medication only if patient's blood pressure greater than 160/100 and he develops symptoms such as worsening chest pain or shortness of breath.

## 2024-03-07 NOTE — HPI
Kamar Loya is a 63 y.o. male with a PMH  has a past medical history of Hypertension and Hypothyroidism, unspecified.  Presented to the ER for further evaluation of dizziness with associated nausea and vomiting after being sent from urgent care.  Patient states he was given meclizine and Zofran earlier with some improvement of his symptoms.  Denies history of vertigo, but states he had 1 other episode when he was in college when he was dizzy.  Denies any recent illnesses, sick contacts, causes of his symptoms.  Patient reports staying hydrated and denies any diuretic therapy.  Denies any other associated symptoms such as headache, visual disturbances, chest pain, palpitations, shortness of breath, dyspnea exertion, abdominal pain common bladder/bowel complaints, or any other symptoms at this time.    ER workup has been unremarkable.  CT of the head and MRI of the brain negative for acute findings.  Patient received 25 mg meclizine, 125 mg Solu-Medrol, 25 mg Phenergan, 1 L normal saline in ED. hospital Medicine consulted to admit patient for dizziness/unsteady gait patient in agreement with treatment plan.  Patient will be admitted under observation status for PT/OT eval for vestibular evaluation.    PCP: No primary care provider on file.

## 2024-03-07 NOTE — H&P
[facility] Medicine  History & Physical    Patient Name: Kamar Loya  MRN: [id number]  Patient Class: OP- Observation  Admission Date: 3/6/2024  Attending Physician: Valentin Mina MD   Primary Care Provider: No primary care provider on file.         Patient information was obtained from patient, past medical records, and ER records.     Subjective:     Principal Problem:Dizziness    Chief Complaint:   Chief Complaint   Patient presents with    Dizziness     Pt. Presents to ED via EMS due to being DX with vertigo this morning at , and having vomiting with any movement         HPI: Kamar Loya is a 63 y.o. male with a PMH  has a past medical history of Hypertension and Hypothyroidism, unspecified.  Presented to the ER for further evaluation of dizziness with associated nausea and vomiting after being sent from urgent care.  Patient states he was given meclizine and Zofran earlier with some improvement of his symptoms.  Denies history of vertigo, but states he had 1 other episode when he was in college when he was dizzy.  Denies any recent illnesses, sick contacts, causes of his symptoms.  Patient reports staying hydrated and denies any diuretic therapy.  Denies any other associated symptoms such as headache, visual disturbances, chest pain, palpitations, shortness of breath, dyspnea exertion, abdominal pain common bladder/bowel complaints, or any other symptoms at this time.    ER workup has been unremarkable.  CT of the head and MRI of the brain negative for acute findings.  Patient received 25 mg meclizine, 125 mg Solu-Medrol, 25 mg Phenergan, 1 L normal saline in ED. hospital Medicine consulted to admit patient for dizziness/unsteady gait patient in agreement with treatment plan.  Patient will be admitted under observation status for PT/OT eval for vestibular evaluation.    PCP: No primary care provider on file.    Past Medical History:   Diagnosis Date    Hypertension      Hypothyroidism, unspecified        History reviewed. No pertinent surgical history.    Review of patient's allergies indicates:  No Known Allergies    No current facility-administered medications on file prior to encounter.     Current Outpatient Medications on File Prior to Encounter   Medication Sig    SYNTHROID 150 mcg tablet Take 1 tablet every day by oral route in the morning.    albuterol (PROVENTIL) 2.5 mg /3 mL (0.083 %) nebulizer solution Inhale 3 mL 3 times a day by nebulization route as needed.    atorvastatin (LIPITOR) 20 MG tablet TAKE ONE TABLET BY MOUTH DAILY AT BEDTIME FOR CHOLESTEROL    losartan (COZAAR) 50 MG tablet      Family History    None       Tobacco Use    Smoking status: Never    Smokeless tobacco: Never   Substance and Sexual Activity    Alcohol use: Never    Drug use: Never    Sexual activity: Not on file     Review of Systems   Cardiovascular:  Negative for chest pain and palpitations.   Neurological:  Positive for dizziness and light-headedness. Negative for syncope and headaches.   All other systems reviewed and are negative.    Objective:     Vital Signs (Most Recent):  Temp: 97.5 °F (36.4 °C) (03/06/24 2300)  Pulse: 62 (03/07/24 0146)  Resp: 20 (03/06/24 2300)  BP: (!) 151/84 (03/06/24 2300)  SpO2: (!) 93 % (03/06/24 2300) Vital Signs (24h Range):  Temp:  [97.5 °F (36.4 °C)-98.7 °F (37.1 °C)] 97.5 °F (36.4 °C)  Pulse:  [62-75] 62  Resp:  [16-20] 20  SpO2:  [93 %-98 %] 93 %  BP: (118-151)/(74-88) 151/84     Weight: (!) 138.3 kg (304 lb 14.3 oz)  Body mass index is 39.15 kg/m².     Physical Exam  Vitals and nursing note reviewed.   Constitutional:       General: He is awake. He is not in acute distress.     Appearance: Normal appearance. He is well-developed and well-groomed. He is not ill-appearing, toxic-appearing or diaphoretic.   HENT:      Head: Normocephalic and atraumatic.      Right Ear: There is impacted cerumen.      Left Ear: There is impacted cerumen.      Ears:       Comments: Large amount of black impacted wax that was obscuring view of Bilateral TMs was removed allowing complete visualization of normal appearing TMs bilaterally. Small amount of irritation/blood noted to ear canal after removal of impacted cerumen.   Eyes:      Extraocular Movements: Extraocular movements intact.      Right eye: No nystagmus.      Left eye: No nystagmus.      Conjunctiva/sclera: Conjunctivae normal.      Pupils: Pupils are equal, round, and reactive to light.   Cardiovascular:      Rate and Rhythm: Normal rate and regular rhythm.      Pulses: Normal pulses.      Heart sounds: Normal heart sounds. No murmur heard.  Pulmonary:      Effort: Pulmonary effort is normal.      Breath sounds: Normal breath sounds.   Abdominal:      General: Bowel sounds are normal.      Palpations: Abdomen is soft.      Tenderness: There is no abdominal tenderness.   Musculoskeletal:      Cervical back: Normal range of motion and neck supple.      Comments: 5/5 strength throughout   Skin:     General: Skin is warm and dry.      Capillary Refill: Capillary refill takes less than 2 seconds.   Neurological:      General: No focal deficit present.      Mental Status: He is alert and oriented to person, place, and time. Mental status is at baseline.      GCS: GCS eye subscore is 4. GCS verbal subscore is 5. GCS motor subscore is 6.      Cranial Nerves: Cranial nerves 2-12 are intact.      Sensory: Sensation is intact.      Motor: Motor function is intact.      Deep Tendon Reflexes: Reflexes are normal and symmetric.   Psychiatric:         Mood and Affect: Mood normal.         Behavior: Behavior normal. Behavior is cooperative.              CRANIAL NERVES     CN III, IV, VI   Pupils are equal, round, and reactive to light.     LABS:  Recent Results (from the past 24 hour(s))   CBC auto differential    Collection Time: 03/06/24  3:49 PM   Result Value Ref Range    WBC 5.94 3.90 - 12.70 K/uL    RBC 5.01 4.60 - 6.20 M/uL     Hemoglobin 15.8 14.0 - 18.0 g/dL    Hematocrit 45.1 40.0 - 54.0 %    MCV 90 82 - 98 fL    MCH 31.5 (H) 27.0 - 31.0 pg    MCHC 35.0 32.0 - 36.0 g/dL    RDW 12.2 11.5 - 14.5 %    Platelets 165 150 - 450 K/uL    MPV 9.2 9.2 - 12.9 fL    Immature Granulocytes 0.3 0.0 - 0.5 %    Gran # (ANC) 5.3 1.8 - 7.7 K/uL    Immature Grans (Abs) 0.02 0.00 - 0.04 K/uL    Lymph # 0.4 (L) 1.0 - 4.8 K/uL    Mono # 0.2 (L) 0.3 - 1.0 K/uL    Eos # 0.0 0.0 - 0.5 K/uL    Baso # 0.02 0.00 - 0.20 K/uL    nRBC 0 0 /100 WBC    Gran % 88.9 (H) 38.0 - 73.0 %    Lymph % 7.2 (L) 18.0 - 48.0 %    Mono % 3.0 (L) 4.0 - 15.0 %    Eosinophil % 0.3 0.0 - 8.0 %    Basophil % 0.3 0.0 - 1.9 %    Differential Method Automated    Comprehensive metabolic panel    Collection Time: 03/06/24  3:49 PM   Result Value Ref Range    Sodium 139 136 - 145 mmol/L    Potassium 4.3 3.5 - 5.1 mmol/L    Chloride 108 95 - 110 mmol/L    CO2 22 (L) 23 - 29 mmol/L    Glucose 125 (H) 70 - 110 mg/dL    BUN 13 8 - 23 mg/dL    Creatinine 1.0 0.5 - 1.4 mg/dL    Calcium 8.8 8.7 - 10.5 mg/dL    Total Protein 7.2 6.0 - 8.4 g/dL    Albumin 3.9 3.5 - 5.2 g/dL    Total Bilirubin 0.7 0.1 - 1.0 mg/dL    Alkaline Phosphatase 62 55 - 135 U/L    AST 26 10 - 40 U/L    ALT 26 10 - 44 U/L    eGFR >60 >60 mL/min/1.73 m^2    Anion Gap 9 8 - 16 mmol/L   Troponin I #1    Collection Time: 03/06/24  3:49 PM   Result Value Ref Range    Troponin I <0.006 0.000 - 0.026 ng/mL   POCT glucose    Collection Time: 03/06/24  3:49 PM   Result Value Ref Range    POCT Glucose 114 (H) 70 - 110 mg/dL   EKG 12-lead    Collection Time: 03/06/24  4:03 PM   Result Value Ref Range    QRS Duration 98 ms    [facility] QTC Calculation 448 ms       RADIOLOGY  MRI Brain Without Contrast    Result Date: 3/6/2024  EXAMINATION: MRI BRAIN WITHOUT CONTRAST CLINICAL HISTORY: Dizziness, persistent/recurrent, cardiac or vascular cause suspected; TECHNIQUE: Multiplanar multisequence MR imaging of the brain was performed without contrast.  COMPARISON: Head CT correlation FINDINGS: No acute ischemia No mass effect or midline shift. No hydrocephalus. Right mastoid air cell effusion Major vascular flow voids present Sinus opacity redemonstrated     no acute intracranial finding Electronically signed by: Aggie Chauhan Date:    03/06/2024 Time:    21:03    CT Head Without Contrast    Result Date: 3/6/2024  EXAMINATION: CT HEAD WITHOUT CONTRAST CLINICAL HISTORY: Dizziness, persistent/recurrent, cardiac or vascular cause suspected; TECHNIQUE: Low dose axial images were obtained through the head.  Coronal and sagittal reformations were also performed. Contrast was not administered. COMPARISON: None available FINDINGS: No acute intracranial hemorrhage or mass effect.  Ventricles nondistended.  No displaced skull fracture.  Mastoid effusion right.  Maxillary sinus opacity bilateral appears chronic     No acute intracranial finding Electronically signed by: Aggie Chauhan Date:    03/06/2024 Time:    17:11      EKG    MICROBIOLOGY    MDM     Amount and/or Complexity of Data Reviewed  Clinical lab tests: reviewed  Tests in the radiology section of CPT®: reviewed  Tests in the medicine section of CPT®: reviewed  Discussion of test results with the performing providers: yes  Decide to obtain previous medical records or to obtain history from someone other than the patient: yes  Obtain history from someone other than the patient: yes  Review and summarize past medical records: yes  Discuss the patient with other providers: yes  Independent visualization of images, tracings, or specimens: yes        Assessment/Plan:     * Dizziness  Advanced imaging negative for posterior stroke. Some improvement with meclizine at outside facility and with removal of impacted cerumen.   Plan:  -IVFs prn  -US carotids  -Hold off on additional doses of meclizine until evaluated by PT/OT  -change positions slowly  -OP ENT f/u for further eval/treatment of peripheral vs  "central causes of dizziness      Impacted cerumen of both ears  Bilateral ears cleared of impacted cerumen. Reports improved hearing and decrease in severity of dizziness with position change.   Plan:  -education on avoiding Q-tips  -education on ear wax removal techniques         Hypertension  Chronic, controlled. Latest blood pressure and vitals reviewed-     Temp:  [97.5 °F (36.4 °C)-98.7 °F (37.1 °C)]   Pulse:  [62-75]   Resp:  [16-20]   BP: (118-151)/(74-88)   SpO2:  [93 %-98 %] .   Home meds for hypertension were reviewed and noted below.   Hypertension Medications               losartan (COZAAR) 50 MG tablet             While in the hospital, will manage blood pressure as follows; Continue home antihypertensive regimen    Will utilize p.r.n. blood pressure medication only if patient's blood pressure greater than 160/100 and he develops symptoms such as worsening chest pain or shortness of breath.    Hypothyroidism  Patient has chronic hypothyroidism. TFTs reviewed- No results found for: "TSH". Will continue chronic levothyroxine and adjust for and clinical changes.          VTE Risk Mitigation (From admission, onward)           Ordered     IP VTE LOW RISK PATIENT  Once         03/06/24 1927     Place sequential compression device  Until discontinued         03/06/24 1927                  //Core Measures   -DVT proph: SCDs,   -Code status Full    -Surrogate:none    Components of this note were documented using a voice recognition system and are subject to errors not corrected at the time the document was proof read. Please contact the author for any clarifications.        Amilcar Mina NP  Department of Hospital Medicine  [facility]          "

## 2024-03-07 NOTE — PLAN OF CARE
See eval for details. Pt displayed deficits with functional mobility/ transfers, deficits with adl's skills also decrease b ue strength/endurance. Recommendation: VESTIBULAR OUT PATIENT

## 2024-03-07 NOTE — ED NOTES
Placed patient on 2L o2 via nasal cannula due to o2 dropping to 88% when sleeping, Patient now at 95%

## 2024-03-07 NOTE — PROGRESS NOTES
[facility] Medicine  Progress Note    Patient Name: Kamar Loya  MRN: [id number]  Patient Class: OP- Observation   Admission Date: 3/6/2024  Length of Stay: 0 days  Attending Physician: Maricruz Law MD  Primary Care Provider: No primary care provider on file.    Subjective:     Principal Problem:Dizziness        HPI:  Kamar Loya is a 63 y.o. male with a PMH  has a past medical history of Hypertension and Hypothyroidism, unspecified.  Presented to the ER for further evaluation of dizziness with associated nausea and vomiting after being sent from urgent care.  Patient states he was given meclizine and Zofran earlier with some improvement of his symptoms.  Denies history of vertigo, but states he had 1 other episode when he was in college when he was dizzy.  Denies any recent illnesses, sick contacts, causes of his symptoms.  Patient reports staying hydrated and denies any diuretic therapy.  Denies any other associated symptoms such as headache, visual disturbances, chest pain, palpitations, shortness of breath, dyspnea exertion, abdominal pain common bladder/bowel complaints, or any other symptoms at this time.    ER workup has been unremarkable.  CT of the head and MRI of the brain negative for acute findings.  Patient received 25 mg meclizine, 125 mg Solu-Medrol, 25 mg Phenergan, 1 L normal saline in ED. hospital Medicine consulted to admit patient for dizziness/unsteady gait patient in agreement with treatment plan.  Patient will be admitted under observation status for PT/OT eval for vestibular evaluation.    PCP: No primary care provider on file.    Overview/Hospital Course:  Kamar Loya is a 63 year old male with history of vertigo who presented to ED with postural dizziness and related to N/V. CT head and MRI brain were negative for acute findings. He cerumen impacted in ED relieved with mild improvement in symptoms. Patient received 25 mg meclizine, 125 mg Solu-Medrol, 25 mg  Phenergan, 1 L normal saline without improvement in symptoms. He was seen by PT/TO with performed [place] Hallpike assessment which was positive. Carotid ultrasound negative. He tolerated Epley maneuver with PT/OT with persistent dizziness and nausea. Patient was not able to sit for one hour after treatment. Discussed with ENT who recommended adding trial of benzodiazepines and continued Epley. Will monitor additional night.     No new subjective & objective note has been filed under this hospital service since the last note was generated.      Assessment/Plan:      * Dizziness  Advanced imaging negative for posterior stroke. Some improvement with meclizine at outside facility and with removal of impacted cerumen.   Plan:  -IVFs prn  -US carotids  -Hold off on additional doses of meclizine until evaluated by PT/OT  -change positions slowly  -OP ENT f/u for further eval/treatment of peripheral vs central causes of dizziness    3/7/24  Carotid doppler negative.  Discussed with ENT who recommended trial of benzodiazepine and continue Epley maneuver via PT/TO  Discussed with patient he will not be able to drive home  at discharge.      Hypothyroidism  Patient has chronic hypothyroidism. TFTs reviewed-   Lab Results   Component Value Date    TSH 0.451 03/07/2024   . Will continue chronic levothyroxine and adjust for and clinical changes.        Hypertension  Chronic, controlled. Latest blood pressure and vitals reviewed-     Temp:  [97.5 °F (36.4 °C)-98.7 °F (37.1 °C)]   Pulse:  [62-75]   Resp:  [16-20]   BP: (118-151)/(74-88)   SpO2:  [93 %-98 %] .   Home meds for hypertension were reviewed and noted below.   Hypertension Medications               losartan (COZAAR) 50 MG tablet             While in the hospital, will manage blood pressure as follows; Continue home antihypertensive regimen    Will utilize p.r.n. blood pressure medication only if patient's blood pressure greater than 160/100 and he develops symptoms such as  worsening chest pain or shortness of breath.    Impacted cerumen of both ears  Bilateral ears cleared of impacted cerumen. Reports improved hearing and decrease in severity of dizziness with position change.   Plan:  -education on avoiding Q-tips  -education on ear wax removal techniques           VTE Risk Mitigation (From admission, onward)           Ordered     IP VTE LOW RISK PATIENT  Once         03/06/24 1927     Place sequential compression device  Until discontinued         03/06/24 1927                    Discharge Planning   FABIAN: 3/8/2024     Code Status: Full Code   Is the patient medically ready for discharge?:     Reason for patient still in hospital (select all that apply): Treatment  Discharge Plan A: Home with family        Luca Haynes NP  Department of Hospital Medicine   [facility]

## 2024-03-07 NOTE — PLAN OF CARE
Discussed poc with pt, verbalized understanding     Purposeful rounding every 2hours    VS wnl  Cardiac monitoring in use  Fall precautions in place, remains injury free  Pt denies c/o pain  N/V being manage with PRN meds    Accurate I&Os  Bed locked at lowest position  Call light within reach    Chart check complete  Will cont with POC    Problem: Adult Inpatient Plan of Care  Goal: Plan of Care Review  Outcome: Ongoing, Progressing  Goal: Patient-Specific Goal (Individualized)  Outcome: Ongoing, Progressing  Goal: Absence of Hospital-Acquired Illness or Injury  Outcome: Ongoing, Progressing  Goal: Optimal Comfort and Wellbeing  Outcome: Ongoing, Progressing  Goal: Readiness for Transition of Care  Outcome: Ongoing, Progressing

## 2024-03-07 NOTE — HOSPITAL COURSE
Kamar Loya is a 63 year old male with history of vertigo who presented to ED with postural dizziness and related to N/V. CT head and MRI brain were negative for acute findings. He cerumen impacted in ED relieved with mild improvement in symptoms. Patient received 25 mg meclizine, 125 mg Solu-Medrol, 25 mg Phenergan, 1 L normal saline without improvement in symptoms. He was seen by PT/TO with performed [place] Hallpike assessment which was positive. Carotid ultrasound negative. He tolerated Epley maneuver with PT/OT with persistent dizziness and nausea. Patient was not able to sit for one hour after treatment. Discussed with ENT who recommended adding trial of benzodiazepines and continued Epley. Will monitor additional night.     3/8/24  Patient dizziness has improved. Epley maneuver today and tolerating sitting up for one hour. He will continue clonazepam as needed for two days for vertigo symtpoms. He was asked to follow up with ENT in one week. He will discharge with family.

## 2024-03-07 NOTE — SUBJECTIVE & OBJECTIVE
Past Medical History:   Diagnosis Date    Hypertension     Hypothyroidism, unspecified        History reviewed. No pertinent surgical history.    Review of patient's allergies indicates:  No Known Allergies    No current facility-administered medications on file prior to encounter.     Current Outpatient Medications on File Prior to Encounter   Medication Sig    SYNTHROID 150 mcg tablet Take 1 tablet every day by oral route in the morning.    albuterol (PROVENTIL) 2.5 mg /3 mL (0.083 %) nebulizer solution Inhale 3 mL 3 times a day by nebulization route as needed.    atorvastatin (LIPITOR) 20 MG tablet TAKE ONE TABLET BY MOUTH DAILY AT BEDTIME FOR CHOLESTEROL    losartan (COZAAR) 50 MG tablet      Family History    None       Tobacco Use    Smoking status: Never    Smokeless tobacco: Never   Substance and Sexual Activity    Alcohol use: Never    Drug use: Never    Sexual activity: Not on file     Review of Systems   Cardiovascular:  Negative for chest pain and palpitations.   Neurological:  Positive for dizziness and light-headedness. Negative for syncope and headaches.   All other systems reviewed and are negative.    Objective:     Vital Signs (Most Recent):  Temp: 97.5 °F (36.4 °C) (03/06/24 2300)  Pulse: 62 (03/07/24 0146)  Resp: 20 (03/06/24 2300)  BP: (!) 151/84 (03/06/24 2300)  SpO2: (!) 93 % (03/06/24 2300) Vital Signs (24h Range):  Temp:  [97.5 °F (36.4 °C)-98.7 °F (37.1 °C)] 97.5 °F (36.4 °C)  Pulse:  [62-75] 62  Resp:  [16-20] 20  SpO2:  [93 %-98 %] 93 %  BP: (118-151)/(74-88) 151/84     Weight: (!) 138.3 kg (304 lb 14.3 oz)  Body mass index is 39.15 kg/m².     Physical Exam  Vitals and nursing note reviewed.   Constitutional:       General: He is awake. He is not in acute distress.     Appearance: Normal appearance. He is well-developed and well-groomed. He is not ill-appearing, toxic-appearing or diaphoretic.   HENT:      Head: Normocephalic and atraumatic.      Right Ear: There is impacted cerumen.       Left Ear: There is impacted cerumen.      Ears:      Comments: Large amount of black impacted wax that was obscuring view of Bilateral TMs was removed allowing complete visualization of normal appearing TMs bilaterally. Small amount of irritation/blood noted to ear canal after removal of impacted cerumen.   Eyes:      Extraocular Movements: Extraocular movements intact.      Right eye: No nystagmus.      Left eye: No nystagmus.      Conjunctiva/sclera: Conjunctivae normal.      Pupils: Pupils are equal, round, and reactive to light.   Cardiovascular:      Rate and Rhythm: Normal rate and regular rhythm.      Pulses: Normal pulses.      Heart sounds: Normal heart sounds. No murmur heard.  Pulmonary:      Effort: Pulmonary effort is normal.      Breath sounds: Normal breath sounds.   Abdominal:      General: Bowel sounds are normal.      Palpations: Abdomen is soft.      Tenderness: There is no abdominal tenderness.   Musculoskeletal:      Cervical back: Normal range of motion and neck supple.      Comments: 5/5 strength throughout   Skin:     General: Skin is warm and dry.      Capillary Refill: Capillary refill takes less than 2 seconds.   Neurological:      General: No focal deficit present.      Mental Status: He is alert and oriented to person, place, and time. Mental status is at baseline.      GCS: GCS eye subscore is 4. GCS verbal subscore is 5. GCS motor subscore is 6.      Cranial Nerves: Cranial nerves 2-12 are intact.      Sensory: Sensation is intact.      Motor: Motor function is intact.      Deep Tendon Reflexes: Reflexes are normal and symmetric.   Psychiatric:         Mood and Affect: Mood normal.         Behavior: Behavior normal. Behavior is cooperative.              CRANIAL NERVES     CN III, IV, VI   Pupils are equal, round, and reactive to light.     LABS:  Recent Results (from the past 24 hour(s))   CBC auto differential    Collection Time: 03/06/24  3:49 PM   Result Value Ref Range    WBC 5.94  3.90 - 12.70 K/uL    RBC 5.01 4.60 - 6.20 M/uL    Hemoglobin 15.8 14.0 - 18.0 g/dL    Hematocrit 45.1 40.0 - 54.0 %    MCV 90 82 - 98 fL    MCH 31.5 (H) 27.0 - 31.0 pg    MCHC 35.0 32.0 - 36.0 g/dL    RDW 12.2 11.5 - 14.5 %    Platelets 165 150 - 450 K/uL    MPV 9.2 9.2 - 12.9 fL    Immature Granulocytes 0.3 0.0 - 0.5 %    Gran # (ANC) 5.3 1.8 - 7.7 K/uL    Immature Grans (Abs) 0.02 0.00 - 0.04 K/uL    Lymph # 0.4 (L) 1.0 - 4.8 K/uL    Mono # 0.2 (L) 0.3 - 1.0 K/uL    Eos # 0.0 0.0 - 0.5 K/uL    Baso # 0.02 0.00 - 0.20 K/uL    nRBC 0 0 /100 WBC    Gran % 88.9 (H) 38.0 - 73.0 %    Lymph % 7.2 (L) 18.0 - 48.0 %    Mono % 3.0 (L) 4.0 - 15.0 %    Eosinophil % 0.3 0.0 - 8.0 %    Basophil % 0.3 0.0 - 1.9 %    Differential Method Automated    Comprehensive metabolic panel    Collection Time: 03/06/24  3:49 PM   Result Value Ref Range    Sodium 139 136 - 145 mmol/L    Potassium 4.3 3.5 - 5.1 mmol/L    Chloride 108 95 - 110 mmol/L    CO2 22 (L) 23 - 29 mmol/L    Glucose 125 (H) 70 - 110 mg/dL    BUN 13 8 - 23 mg/dL    Creatinine 1.0 0.5 - 1.4 mg/dL    Calcium 8.8 8.7 - 10.5 mg/dL    Total Protein 7.2 6.0 - 8.4 g/dL    Albumin 3.9 3.5 - 5.2 g/dL    Total Bilirubin 0.7 0.1 - 1.0 mg/dL    Alkaline Phosphatase 62 55 - 135 U/L    AST 26 10 - 40 U/L    ALT 26 10 - 44 U/L    eGFR >60 >60 mL/min/1.73 m^2    Anion Gap 9 8 - 16 mmol/L   Troponin I #1    Collection Time: 03/06/24  3:49 PM   Result Value Ref Range    Troponin I <0.006 0.000 - 0.026 ng/mL   POCT glucose    Collection Time: 03/06/24  3:49 PM   Result Value Ref Range    POCT Glucose 114 (H) 70 - 110 mg/dL   EKG 12-lead    Collection Time: 03/06/24  4:03 PM   Result Value Ref Range    QRS Duration 98 ms    [facility] QTC Calculation 448 ms       RADIOLOGY  MRI Brain Without Contrast    Result Date: 3/6/2024  EXAMINATION: MRI BRAIN WITHOUT CONTRAST CLINICAL HISTORY: Dizziness, persistent/recurrent, cardiac or vascular cause suspected; TECHNIQUE: Multiplanar multisequence MR imaging  of the brain was performed without contrast. COMPARISON: Head CT correlation FINDINGS: No acute ischemia No mass effect or midline shift. No hydrocephalus. Right mastoid air cell effusion Major vascular flow voids present Sinus opacity redemonstrated     no acute intracranial finding Electronically signed by: Aggie Chauhan Date:    03/06/2024 Time:    21:03    CT Head Without Contrast    Result Date: 3/6/2024  EXAMINATION: CT HEAD WITHOUT CONTRAST CLINICAL HISTORY: Dizziness, persistent/recurrent, cardiac or vascular cause suspected; TECHNIQUE: Low dose axial images were obtained through the head.  Coronal and sagittal reformations were also performed. Contrast was not administered. COMPARISON: None available FINDINGS: No acute intracranial hemorrhage or mass effect.  Ventricles nondistended.  No displaced skull fracture.  Mastoid effusion right.  Maxillary sinus opacity bilateral appears chronic     No acute intracranial finding Electronically signed by: Aggie Chauhan Date:    03/06/2024 Time:    17:11      EKG    MICROBIOLOGY    MDM     Amount and/or Complexity of Data Reviewed  Clinical lab tests: reviewed  Tests in the radiology section of CPT®: reviewed  Tests in the medicine section of CPT®: reviewed  Discussion of test results with the performing providers: yes  Decide to obtain previous medical records or to obtain history from someone other than the patient: yes  Obtain history from someone other than the patient: yes  Review and summarize past medical records: yes  Discuss the patient with other providers: yes  Independent visualization of images, tracings, or specimens: yes

## 2024-03-07 NOTE — ASSESSMENT & PLAN NOTE
"Patient has chronic hypothyroidism. TFTs reviewed- No results found for: "TSH". Will continue chronic levothyroxine and adjust for and clinical changes.      "

## 2024-03-07 NOTE — PT/OT/SLP EVAL
Physical Therapy Evaluation    Patient Name:  Kamar Loya   MRN:  [id number]    Recommendations:     Discharge Recommendations: Low Intensity Therapy (OUT PT VESTIBULAR REHAB)   Discharge Equipment Recommendations: walker, rolling   Barriers to discharge: Decreased caregiver support    Assessment:     Kamar Loya is a 63 y.o. male admitted with a medical diagnosis of Dizziness.  He presents with the following impairments/functional limitations: impaired self care skills, impaired functional mobility, gait instability, impaired balance .    Rehab Prognosis: Good; patient would benefit from acute skilled PT services to address these deficits and reach maximum level of function.    Recent Surgery: * No surgery found *      Plan:     During this hospitalization, patient to be seen 3 x/week to address the identified rehab impairments via gait training, therapeutic activities, therapeutic exercises, canalith reposition procedure and progress toward the following goals:    Plan of Care Expires:  03/21/24    Subjective     Chief Complaint: DIZZINESS   Patient/Family Comments/goals: RETURN TO PLOF   Pain/Comfort:  Pain Rating 1: 0/10  Pain Rating Post-Intervention 1: 0/10    Patients cultural, spiritual, Oriental orthodox conflicts given the current situation:      Living Environment:  PT LIVES AT HOME IN [facility] WITH WIFE IN A ONE STORY HOME WITH NO STEPS TO ENTER   Prior to admission, patients level of function was IND, DRIVES AND WORKS.  Equipment used at home: none.  DME owned (not currently used): shower chair and GRAB BARS .  Upon discharge, patient will have assistance from UNKNOWN .    Objective:     Communicated with NURSE PIKE AND [facility] CHART REVIEW prior to session.  Patient found supine with peripheral IV, telemetry, oxygen  upon PT entry to room.    General Precautions: Standard, fall  Orthopedic Precautions:N/A   Braces: N/A  Respiratory Status: Room air    Exams:  Cognitive Exam:  Patient is oriented to  Person, Place, Time, and Situation  RLE ROM: WNL  RLE Strength: WNL  LLE ROM: WNL  LLE Strength: WNL    Functional Mobility:  Bed Mobility:     Rolling Left:  supervision  Scooting: supervision  Supine to Sit: supervision  Sit to Supine: supervision  Transfers:     Sit to Stand:  contact guard assistance with hand-held assist  Bed to Chair: contact guard assistance with  hand-held assist  using  Stand Pivot      AM-PAC 6 CLICK MOBILITY  Total Score:16       Treatment & Education:  P.T. EDUCATED PT ON JOSE HALLPIKE MANEUVER ASSESSMENT. PT REPORTED UNDERSTANDING AND TEST COMPLETED WITH + RESULTS FOR LEFT HALLPIKE. P.T. COMPLETED EPLEY MANEUVER AND EDUCATED PT TO SIT UPRIGHT FOR 1 HOUR AFTER. P.T. TO RETURN LATER TO COMPLETED TX 2ND TIME. PT T/F TO CHAIR WITH CGA AND LEFT SEATED WITH ALL NEEDS MET CALL BELL IN REACH AND NURSE AWARE.     Patient left up in chair with call button in reach.    GOALS:   Multidisciplinary Problems       Physical Therapy Goals          Problem: Physical Therapy    Goal Priority Disciplines Outcome Goal Variances Interventions   Physical Therapy Goal     PT, PT/OT      Description: LTG: 3/21/24  1. PT WILL TOLERANCE EPLEY MANEUVER TO TX BPPV.   2. PT WILL GT TRAIN X 150' WITH LRAD AND SBA.   3. PT WILL INC AMPAC SCORE BY 2 POINTS TO PROGRESS GROSS FUNC MOBILITY.                          History:     Past Medical History:   Diagnosis Date    Hypertension     Hypothyroidism, unspecified        History reviewed. No pertinent surgical history.    Time Tracking:     PT Received On: 03/07/24  PT Start Time: 0720     PT Stop Time: 0745  PT Total Time (min): 25 min     Billable Minutes: Evaluation 15 and Therapeutic Activity 10      03/07/2024

## 2024-03-07 NOTE — PLAN OF CARE
[facility] - Med Surg  Discharge Assessment    Primary Care Provider: No primary care provider on file.     Discharge Assessment (most recent)       BRIEF DISCHARGE ASSESSMENT - 03/07/24 1013          Discharge Planning    Assessment Type Discharge Planning Brief Assessment     Resource/Environmental Concerns none     Support Systems Spouse/significant other     Assistance Needed Independent     Equipment Currently Used at Home none     Current Living Arrangements --   travel trailer; working here at this time    Patient/Family Anticipates Transition to home with family     Patient/Family Anticipated Services at Transition none     DME Needed Upon Discharge  none     Discharge Plan A Home with family                   Sw met with patient to complete assessment and to discuss d/c planning needs. Pt currently here for work; staying in a travel trailer.

## 2024-03-07 NOTE — ASSESSMENT & PLAN NOTE
Patient has chronic hypothyroidism. TFTs reviewed-   Lab Results   Component Value Date    TSH 0.451 03/07/2024   . Will continue chronic levothyroxine and adjust for and clinical changes.

## 2024-03-07 NOTE — ASSESSMENT & PLAN NOTE
Bilateral ears cleared of impacted cerumen. Reports improved hearing and decrease in severity of dizziness with position change.   Plan:  -education on avoiding Q-tips  -education on ear wax removal techniques

## 2024-03-07 NOTE — PT/OT/SLP PROGRESS
"Physical Therapy  Treatment    Kamar Loya   MRN: [id number]   Admitting Diagnosis: Dizziness    PT Received On: 03/07/24  PT Start Time: 1050     PT Stop Time: 1115    PT Total Time (min): 25 min       Billable Minutes:  Gait Training 10 CANALITH REPOSITIONING: 15    Treatment Type: Treatment  PT/PTA: PT     Number of PTA visits since last PT visit: 0       General Precautions: Standard, fall  Orthopedic Precautions: N/A  Braces: N/A  Respiratory Status: Room air         Subjective:  Communicated with NURSE BENDER AND [facility] CHART REVIEW  prior to session.   PT AGREED TO TX     Pain/Comfort  Pain Rating 1: 0/10  Pain Rating Post-Intervention 1: 0/10    Objective:   Patient found with: peripheral IV, telemetry    Functional Mobility:  PT MET IN  AND REPORTED HE WAS UNABLE TO SIT X 1 HOUR AFTER TX THIS AM. P.T. RE-EDUCATED ON IMPORTANCE OF BEING UPRIGHT AFTER EPLEY. P.T. COMPLETED EPLEY TO CORRECT + LEFT HALLPIKE. PT RETURNED SEATED EOB AFTER REPORTED INC NAUSEA. GT. BELT AND  SOCKS DONNED PRIOR TO OOB MOBILITY. PT STOOD WITH RW AND GT TRAINED X 18' WITH MIN A. PT RETURNED SEATED EOB AND SAT LONG SITTING IN BED WITH HOB ELEVATED. P.T. CALLED PT NURSE FOR NAUSEA /MEDS. PT LEFT WITH ALL NEEDS MET .  Treatment and Education:  PT EDUCATED ON "CALL DON'T FALL", ENCOURAGED TO CALL FOR ASSISTANCE WITH ALL NEEDS FOR OOB MOBILITY.       AM-PAC 6 CLICK MOBILITY  How much help from another person does this patient currently need?   1 = Unable, Total/Dependent Assistance  2 = A lot, Maximum/Moderate Assistance  3 = A little, Minimum/Contact Guard/Supervision  4 = None, Modified [place]/Independent    Turning over in bed (including adjusting bedclothes, sheets and blankets)?: 4  Sitting down on and standing up from a chair with arms (e.g., wheelchair, bedside commode, etc.): 3  Moving from lying on back to sitting on the side of the bed?: 4  Moving to and from a bed to a chair (including a wheelchair)?: 3  Need to " walk in hospital room?: 3  Climbing 3-5 steps with a railing?: 1  Basic Mobility Total Score: 18    AM-PAC Raw Score CMS G-Code Modifier Level of Impairment Assistance   6 % Total / Unable   7 - 9 CM 80 - 100% Maximal Assist   10 - 14 CL 60 - 80% Moderate Assist   15 - 19 CK 40 - 60% Moderate Assist   20 - 22 CJ 20 - 40% Minimal Assist   23 CI 1-20% SBA / CGA   24 CH 0% Independent/ Mod I     Patient left HOB elevated with call button in reach and bed alarm on.    Assessment:  PT JENNIFER TX WITH INC NAUASEA AND DIZZINESS REMAINED HOWEVER NOT WORSE.     Rehab identified problem list/impairments: impaired endurance, impaired self care skills, impaired functional mobility, gait instability, impaired balance, visual deficits    Rehab potential is good.    Activity tolerance: Fair    Discharge recommendations: Low Intensity Therapy      Barriers to discharge:      Equipment recommendations: walker, rolling     GOALS:   Multidisciplinary Problems       Physical Therapy Goals          Problem: Physical Therapy    Goal Priority Disciplines Outcome Goal Variances Interventions   Physical Therapy Goal     PT, PT/OT Ongoing, Progressing     Description: LTG: 3/21/24  1. PT WILL TOLERANCE EPLEY MANEUVER TO TX BPPV.   2. PT WILL GT TRAIN X 150' WITH LRAD AND SBA.   3. PT WILL INC AMPAC SCORE BY 2 POINTS TO PROGRESS GROSS FUNC MOBILITY.                          PLAN:    Patient to be seen 3 x/week to address the above listed problems via gait training, canalith reposition procedure, therapeutic activities  Plan of Care expires: 03/21/24  Plan of Care reviewed with: patient         03/07/2024

## 2024-03-07 NOTE — ASSESSMENT & PLAN NOTE
Advanced imaging negative for posterior stroke. Some improvement with meclizine at outside facility and with removal of impacted cerumen.   Plan:  -IVFs prn  -US carotids  -Hold off on additional doses of meclizine until evaluated by PT/OT  -change positions slowly  -OP ENT f/u for further eval/treatment of peripheral vs central causes of dizziness

## 2024-03-07 NOTE — ED NOTES
Patient c/o sudden onset of right sided numbness/tingling to face. MD notified. No new orders at this time

## 2024-03-07 NOTE — PLAN OF CARE
Discussed POC with patient. Patient verbalized understanding.   Patient remains free from falls and injury this shift.   Safety precautions maintained.  No s/s of acute distress.   Purposeful rounding continued this shift.  Pain controlled per MD order, check MAR.  Diet orders continued this shift:   Vital signs continued per orders.  Chart check complete.  Patient education and care continued this shift.    Problem: Adult Inpatient Plan of Care  Goal: Plan of Care Review  Outcome: Ongoing, Progressing  Goal: Patient-Specific Goal (Individualized)  Outcome: Ongoing, Progressing  Goal: Absence of Hospital-Acquired Illness or Injury  Outcome: Ongoing, Progressing  Goal: Optimal Comfort and Wellbeing  Outcome: Ongoing, Progressing  Goal: Readiness for Transition of Care  Outcome: Ongoing, Progressing

## 2024-03-08 VITALS
HEIGHT: 74 IN | OXYGEN SATURATION: 95 % | BODY MASS INDEX: 39.13 KG/M2 | HEART RATE: 70 BPM | TEMPERATURE: 98 F | WEIGHT: 304.88 LBS | RESPIRATION RATE: 12 BRPM | SYSTOLIC BLOOD PRESSURE: 140 MMHG | DIASTOLIC BLOOD PRESSURE: 78 MMHG

## 2024-03-08 LAB
ANION GAP SERPL CALC-SCNC: 7 MMOL/L (ref 8–16)
BASOPHILS # BLD AUTO: 0.04 K/UL (ref 0–0.2)
BASOPHILS NFR BLD: 0.6 % (ref 0–1.9)
BUN SERPL-MCNC: 16 MG/DL (ref 8–23)
CALCIUM SERPL-MCNC: 8.8 MG/DL (ref 8.7–10.5)
CHLORIDE SERPL-SCNC: 107 MMOL/L (ref 95–110)
CO2 SERPL-SCNC: 25 MMOL/L (ref 23–29)
CREAT SERPL-MCNC: 1.1 MG/DL (ref 0.5–1.4)
DIFFERENTIAL METHOD BLD: ABNORMAL
EOSINOPHIL # BLD AUTO: 0 K/UL (ref 0–0.5)
EOSINOPHIL NFR BLD: 0.3 % (ref 0–8)
ERYTHROCYTE [DISTWIDTH] IN BLOOD BY AUTOMATED COUNT: 12.4 % (ref 11.5–14.5)
EST. GFR  (NO RACE VARIABLE): >60 ML/MIN/1.73 M^2
GLUCOSE SERPL-MCNC: 118 MG/DL (ref 70–110)
HCT VFR BLD AUTO: 43 % (ref 40–54)
HGB BLD-MCNC: 14.7 G/DL (ref 14–18)
IMM GRANULOCYTES # BLD AUTO: 0.02 K/UL (ref 0–0.04)
IMM GRANULOCYTES NFR BLD AUTO: 0.3 % (ref 0–0.5)
LYMPHOCYTES # BLD AUTO: 1.3 K/UL (ref 1–4.8)
LYMPHOCYTES NFR BLD: 19.3 % (ref 18–48)
MCH RBC QN AUTO: 31.6 PG (ref 27–31)
MCHC RBC AUTO-ENTMCNC: 34.2 G/DL (ref 32–36)
MCV RBC AUTO: 93 FL (ref 82–98)
MONOCYTES # BLD AUTO: 0.5 K/UL (ref 0.3–1)
MONOCYTES NFR BLD: 6.5 % (ref 4–15)
NEUTROPHILS # BLD AUTO: 5.1 K/UL (ref 1.8–7.7)
NEUTROPHILS NFR BLD: 73 % (ref 38–73)
NRBC BLD-RTO: 0 /100 WBC
PLATELET # BLD AUTO: 186 K/UL (ref 150–450)
PMV BLD AUTO: 9.3 FL (ref 9.2–12.9)
POTASSIUM SERPL-SCNC: 4.1 MMOL/L (ref 3.5–5.1)
RBC # BLD AUTO: 4.65 M/UL (ref 4.6–6.2)
SODIUM SERPL-SCNC: 139 MMOL/L (ref 136–145)
WBC # BLD AUTO: 6.95 K/UL (ref 3.9–12.7)

## 2024-03-08 PROCEDURE — 25000003 PHARM REV CODE 250: Performed by: NURSE PRACTITIONER

## 2024-03-08 PROCEDURE — 95992 CANALITH REPOSITIONING PROC: CPT

## 2024-03-08 PROCEDURE — 97116 GAIT TRAINING THERAPY: CPT

## 2024-03-08 PROCEDURE — 85025 COMPLETE CBC W/AUTO DIFF WBC: CPT | Performed by: NURSE PRACTITIONER

## 2024-03-08 PROCEDURE — 80048 BASIC METABOLIC PNL TOTAL CA: CPT | Performed by: NURSE PRACTITIONER

## 2024-03-08 PROCEDURE — 36415 COLL VENOUS BLD VENIPUNCTURE: CPT | Performed by: NURSE PRACTITIONER

## 2024-03-08 PROCEDURE — G0378 HOSPITAL OBSERVATION PER HR: HCPCS

## 2024-03-08 PROCEDURE — 97530 THERAPEUTIC ACTIVITIES: CPT | Mod: 59

## 2024-03-08 PROCEDURE — 97535 SELF CARE MNGMENT TRAINING: CPT

## 2024-03-08 RX ORDER — CLONAZEPAM 0.5 MG/1
0.5 TABLET ORAL 2 TIMES DAILY PRN
Qty: 6 TABLET | Refills: 0 | Status: SHIPPED | OUTPATIENT
Start: 2024-03-08 | End: 2025-03-08

## 2024-03-08 RX ORDER — ONDANSETRON 4 MG/1
8 TABLET, ORALLY DISINTEGRATING ORAL EVERY 6 HOURS PRN
Qty: 20 TABLET | Refills: 0 | Status: SHIPPED | OUTPATIENT
Start: 2024-03-08

## 2024-03-08 RX ORDER — SODIUM CHLORIDE 9 MG/ML
INJECTION, SOLUTION INTRAVENOUS CONTINUOUS
Status: DISCONTINUED | OUTPATIENT
Start: 2024-03-08 | End: 2024-03-08 | Stop reason: HOSPADM

## 2024-03-08 RX ADMIN — CLONAZEPAM 0.5 MG: 0.5 TABLET ORAL at 05:03

## 2024-03-08 RX ADMIN — ONDANSETRON 4 MG: 4 TABLET, ORALLY DISINTEGRATING ORAL at 12:03

## 2024-03-08 RX ADMIN — ACETAMINOPHEN 650 MG: 325 TABLET ORAL at 05:03

## 2024-03-08 RX ADMIN — LEVOTHYROXINE SODIUM 150 MCG: 150 TABLET ORAL at 05:03

## 2024-03-08 RX ADMIN — LOSARTAN POTASSIUM 50 MG: 50 TABLET, FILM COATED ORAL at 09:03

## 2024-03-08 NOTE — PT/OT/SLP PROGRESS
"Physical Therapy  Treatment    Kamar Loya   MRN: [id number]   Admitting Diagnosis: Dizziness    PT Received On: 03/08/24  PT Start Time: 0750     PT Stop Time: 0815    PT Total Time (min): 25 min       Billable Minutes:  Gait Training 15 CANALITH REPOSITIONING 10    Treatment Type: Treatment  PT/PTA: PT     Number of PTA visits since last PT visit: 0       General Precautions: Standard, fall  Orthopedic Precautions: N/A  Braces: N/A  Respiratory Status: Room air         Subjective:  Communicated with NURSE PIKE AND [facility] CHART REVIEW prior to session.  PT AGREED TO TX     Pain/Comfort  Pain Rating 1: 0/10  Pain Rating Post-Intervention 1: 0/10    Objective:   Patient found with: peripheral IV    Functional Mobility:  PT MET IN  THIS AM. PT REPORTED CONT DIZZINESS AND NAUSEA HOWEVER WAS ABLE TO SIT EOB FOR INC TIME IN THE NIGHT. PT SEATED EOB WITH S. GT. BELT AND  SOCKS DONNED PRIOR TO OOB MOBILITY. PT GT TRAINED X 20' WITH RW AND CGA. PT RETURNED TO BEDSIDE AND T/F WITH CGA. PT POSITIONED IN LONG SIT WITH HEAD TO FOOT OF BED. PT BED RAILING OFF FOR P.T. TO COMPLETE EPLEY TO CORRECT + L HALLPIKE JOSE. PT TOLERATED WITH INC DIZZINESS/ MINIMAL NAUSEA. PT EDUCATED TO SIT UPRIGHT IN BED AFTER X 1 HOUR. PT REPORTED FEELING " BETTER" AT END OF TX COMPARED TO BEGINNING OF TX SESSION. PT SET UP WITH BREAKFAST AND LEFT WITH ALL NEEDS MET.     Treatment and Education:  PT EDUCATED ON "CALL DON'T FALL", ENCOURAGED TO CALL FOR ASSISTANCE WITH ALL NEEDS FOR OOB MOBILITY.       AM-PAC 6 CLICK MOBILITY  How much help from another person does this patient currently need?   1 = Unable, Total/Dependent Assistance  2 = A lot, Maximum/Moderate Assistance  3 = A little, Minimum/Contact Guard/Supervision  4 = None, Modified [place]/Independent    Turning over in bed (including adjusting bedclothes, sheets and blankets)?: 4  Sitting down on and standing up from a chair with arms (e.g., wheelchair, bedside commode, etc.): " 3  Moving from lying on back to sitting on the side of the bed?: 4  Moving to and from a bed to a chair (including a wheelchair)?: 3  Need to walk in hospital room?: 3  Climbing 3-5 steps with a railing?: 1  Basic Mobility Total Score: 18    AM-PAC Raw Score CMS G-Code Modifier Level of Impairment Assistance   6 % Total / Unable   7 - 9 CM 80 - 100% Maximal Assist   10 - 14 CL 60 - 80% Moderate Assist   15 - 19 CK 40 - 60% Moderate Assist   20 - 22 CJ 20 - 40% Minimal Assist   23 CI 1-20% SBA / CGA   24 CH 0% Independent/ Mod I     Patient left HOB elevated with call button in reach and bed alarm on.    Assessment:  PT JENNIFER EPLEY MANEUVER AND BALANCE WITH GT IMPROVEMENT NOTED WITH LESS ASSIST TODAY.     Rehab identified problem list/impairments: impaired balance, gait instability, visual deficits    Rehab potential is good.    Activity tolerance: Fair    Discharge recommendations: Low Intensity Therapy (VESTIBULAR REHAB)      Barriers to discharge:      Equipment recommendations: walker, rolling     GOALS:   Multidisciplinary Problems       Physical Therapy Goals          Problem: Physical Therapy    Goal Priority Disciplines Outcome Goal Variances Interventions   Physical Therapy Goal     PT, PT/OT Ongoing, Progressing     Description: LTG: 3/21/24  1. PT WILL TOLERANCE EPLEY MANEUVER TO TX BPPV.   2. PT WILL GT TRAIN X 150' WITH LRAD AND SBA.   3. PT WILL INC AMPAC SCORE BY 2 POINTS TO PROGRESS GROSS FUNC MOBILITY.                          PLAN:    Patient to be seen 3 x/week to address the above listed problems via gait training, canalith reposition procedure, therapeutic activities  Plan of Care expires: 03/21/24  Plan of Care reviewed with: patient         03/08/2024

## 2024-03-08 NOTE — PT/OT/SLP PROGRESS
Occupational Therapy  Treatment    Kamar Loya   MRN: [id number]   Admitting Diagnosis: Dizziness    OT Date of Treatment: 03/08/24   OT Start Time: 0920  OT Stop Time: 0945  OT Total Time (min): 25 min    Billable Minutes:  Self Care/Home Management 10 and Therapeutic Activity 15    OT/MAXIMILIAN: OT          General Precautions: Standard, fall  Orthopedic Precautions: N/A  Braces: N/A  Respiratory Status: Room air         Subjective:  Communicated with nurse and epic chart review prior to session.  Pain/Comfort  Pain Rating 1: 0/10    Objective:  Patient found with: telemetry     Functional Mobility:  Bed Mobility:    Mod (I) with use of bed rails intermittently  Transfers:    (I) with seated scooting     Functional Ambulation:   Pt ambulated 10 feet with min a with hand held assist x 1 lob however was able to self correct    Activities of Daily Living:  G/H task s with fair standing balance at sink side ( weightbearing through arms  UE dressing SBA with [facility] seated EOB   Balance:   Static Sit: FAIR+: Able to take MINIMAL challenges from all directions  Dynamic Sit: FAIR+: Maintains balance through MINIMAL excursions of active trunk motion  Static Stand: POOR+: Needs MINIMAL assist to maintain  Dynamic stand: POOR+: Needs MIN (minimal ) assist during gait    Therapeutic Activities and Exercises:  Educated patient on importance of increased tolerance to upright position and direct impact on CV endurance and strength. Patient encouraged to sit up in chair for a minimum of 2 consecutive hours including for all meals.. Encouraged patient to perform AROM TE to BUE throughout the day within all available planes of motion. Re enforced importance of utilizing call light to meet needs in room and not attempt to get up without staff assistance. Patient verbalized understanding and agreed to comply.         AM-PAC 6 CLICK ADL   How much help from another person does this patient currently need?   1 = Unable,  "Total/Dependent Assistance  2 = A lot, Maximum/Moderate Assistance  3 = A little, Minimum/Contact Guard/Supervision  4 = None, Modified [place]/Independent    Putting on and taking off regular lower body clothing? : 3  Bathing (including washing, rinsing, drying)?: 3  Toileting, which includes using toilet, bedpan, or urinal? : 3  Putting on and taking off regular upper body clothing?: 3  Taking care of personal grooming such as brushing teeth?: 3  Eating meals?: 4  Daily Activity Total Score: 19     AM-PAC Raw Score CMS "G-Code Modifier Level of Impairment Assistance   6 % Total / Unable   7 - 8 CM 80 - 100% Maximal Assist   9-13 CL 60 - 80% Moderate Assist   14 - 19 CK 40 - 60% Moderate Assist   20 - 22 CJ 20 - 40% Minimal Assist   23 CI 1-20% SBA / CGA   24 CH 0% Independent/ Mod I       Patient left  sitting on sofa  with all lines intact, call button in reach, and nurse notified    ASSESSMENT:  Kamar Loya is a 63 y.o. male with a medical diagnosis of Dizziness and presents with debility and generalized weakness. .    Rehab identified problem list/impairments:  weakness, impaired functional mobility, decreased safety awareness, impaired endurance, gait instability, impaired balance, impaired self care skills, decreased lower extremity function    Rehab potential is good.    Activity tolerance: Good    Discharge recommendations: Low Intensity Therapy (vestibular out patient)   Barriers to discharge:      Equipment recommendations: walker, rolling    GOALS:   Multidisciplinary Problems       Occupational Therapy Goals          Problem: Occupational Therapy    Goal Priority Disciplines Outcome Interventions   Occupational Therapy Goal     OT, PT/OT Ongoing, Progressing    Description: O.T. GOALS TO BE MET BY 3-20-24  PT TOLERATED 1 SET X 15 REPS B UE ROM EXERCISE  S WITH LE DRESSING  S WITH UE DRESSING   S WITH TOILET TRANSFER                       Plan:  Patient to be seen 2 x/week to address the " above listed problems via self-care/home management, therapeutic activities, therapeutic exercises  Plan of Care expires: 03/21/24  Plan of Care reviewed with: patient         03/08/2024

## 2024-03-08 NOTE — PLAN OF CARE
[facility] - Med Surg  Discharge Final Note    Primary Care Provider: No primary care provider on file.    Expected Discharge Date: 3/8/2024    Final Discharge Note (most recent)       Final Note - 03/08/24 0844          Final Note    Assessment Type Final Discharge Note     Anticipated Discharge Disposition Home or Self Care        Post-Acute Status    Discharge Delays None known at this time                     No d/c needs/orders at this time.     Plan for pt to d/c once PT/OT asses.

## 2024-03-08 NOTE — PLAN OF CARE
PT GT TRAINED X 20' WITH RW AND CGA. PT CONT TO HAVE DIZZINESS AND NAUSEA HOWEVER REPORTED MILD IMPROVEMENSTS

## 2024-03-09 NOTE — PROGRESS NOTES
[facility] Medicine  Progress Note    Patient Name: Kamar Loya  MRN: [id number]  Patient Class: OP- Observation   Admission Date: 3/6/2024  Length of Stay: 0 days  Attending Physician: No att. providers found  Primary Care Provider: No primary care provider on file.    Subjective:     Principal Problem:Dizziness        HPI:  Kamar Loya is a 63 y.o. male with a PMH  has a past medical history of Hypertension and Hypothyroidism, unspecified.  Presented to the ER for further evaluation of dizziness with associated nausea and vomiting after being sent from urgent care.  Patient states he was given meclizine and Zofran earlier with some improvement of his symptoms.  Denies history of vertigo, but states he had 1 other episode when he was in college when he was dizzy.  Denies any recent illnesses, sick contacts, causes of his symptoms.  Patient reports staying hydrated and denies any diuretic therapy.  Denies any other associated symptoms such as headache, visual disturbances, chest pain, palpitations, shortness of breath, dyspnea exertion, abdominal pain common bladder/bowel complaints, or any other symptoms at this time.    ER workup has been unremarkable.  CT of the head and MRI of the brain negative for acute findings.  Patient received 25 mg meclizine, 125 mg Solu-Medrol, 25 mg Phenergan, 1 L normal saline in ED. hospital Medicine consulted to admit patient for dizziness/unsteady gait patient in agreement with treatment plan.  Patient will be admitted under observation status for PT/OT eval for vestibular evaluation.    PCP: No primary care provider on file.    Overview/Hospital Course:  Kamar Loya is a 63 year old male with history of vertigo who presented to ED with postural dizziness and related to N/V. CT head and MRI brain were negative for acute findings. He cerumen impacted in ED relieved with mild improvement in symptoms. Patient received 25 mg meclizine, 125 mg Solu-Medrol, 25 mg  Phenergan, 1 L normal saline without improvement in symptoms. He was seen by PT/TO with performed [place] Hallpike assessment which was positive. Carotid ultrasound negative. He tolerated Epley maneuver with PT/OT with persistent dizziness and nausea. Patient was not able to sit for one hour after treatment. Discussed with ENT who recommended adding trial of benzodiazepines and continued Epley. Will monitor additional night.     3/8/24  Patient dizziness has improved. Epley maneuver today and tolerating sitting up for one hour. He will continue clonazepam as needed for two days for vertigo symtpoms. He was asked to follow up with ENT in one week. He will discharge with family.     No new subjective & objective note has been filed under this hospital service since the last note was generated.      Assessment/Plan:      * Dizziness  Advanced imaging negative for posterior stroke. Some improvement with meclizine at outside facility and with removal of impacted cerumen.   Plan:  -IVFs prn  -US carotids  -Hold off on additional doses of meclizine until evaluated by PT/OT  -change positions slowly  -OP ENT f/u for further eval/treatment of peripheral vs central causes of dizziness    3/7/24  Carotid doppler negative.  Discussed with ENT who recommended trial of benzodiazepine and continue Epley maneuver via PT/TO  Discussed with patient he will not be able to drive home  at discharge.      Hypothyroidism  Patient has chronic hypothyroidism. TFTs reviewed-   Lab Results   Component Value Date    TSH 0.451 03/07/2024   . Will continue chronic levothyroxine and adjust for and clinical changes.        Hypertension  Chronic, controlled. Latest blood pressure and vitals reviewed-     Temp:  [97.5 °F (36.4 °C)-98.7 °F (37.1 °C)]   Pulse:  [62-75]   Resp:  [16-20]   BP: (118-151)/(74-88)   SpO2:  [93 %-98 %] .   Home meds for hypertension were reviewed and noted below.   Hypertension Medications               losartan (COZAAR) 50 MG  tablet             While in the hospital, will manage blood pressure as follows; Continue home antihypertensive regimen    Will utilize p.r.n. blood pressure medication only if patient's blood pressure greater than 160/100 and he develops symptoms such as worsening chest pain or shortness of breath.    Impacted cerumen of both ears  Bilateral ears cleared of impacted cerumen. Reports improved hearing and decrease in severity of dizziness with position change.   Plan:  -education on avoiding Q-tips  -education on ear wax removal techniques           VTE Risk Mitigation (From admission, onward)           Ordered     IP VTE LOW RISK PATIENT  Once         03/06/24 1927                    Discharge Planning   FABIAN: 3/8/2024     Code Status: Prior   Is the patient medically ready for discharge?:     Reason for patient still in hospital (select all that apply): Treatment  Discharge Plan A: Home with family   Discharge Delays: None known at this time        Luca Haynes NP  Department of Hospital Medicine   [facility] - Med [facility]

## 2024-03-09 NOTE — DISCHARGE SUMMARY
[facility] Medicine  Discharge Summary      Patient Name: Kamar Loya  MRN: [id number]  NICHOLE: [id number]  Patient Class: OP- Observation  Admission Date: 3/6/2024  Hospital Length of Stay: 0 days  Discharge Date and Time:  03/08/2024 6:36 PM  Attending Physician: No att. providers found   Discharging Provider: Luca Haynes NP  Primary Care Provider: No primary care provider on file.    Primary Care Team: Networked reference to record PCT     HPI:   Kamar Loya is a 63 y.o. male with a PMH  has a past medical history of Hypertension and Hypothyroidism, unspecified.  Presented to the ER for further evaluation of dizziness with associated nausea and vomiting after being sent from urgent care.  Patient states he was given meclizine and Zofran earlier with some improvement of his symptoms.  Denies history of vertigo, but states he had 1 other episode when he was in college when he was dizzy.  Denies any recent illnesses, sick contacts, causes of his symptoms.  Patient reports staying hydrated and denies any diuretic therapy.  Denies any other associated symptoms such as headache, visual disturbances, chest pain, palpitations, shortness of breath, dyspnea exertion, abdominal pain common bladder/bowel complaints, or any other symptoms at this time.    ER workup has been unremarkable.  CT of the head and MRI of the brain negative for acute findings.  Patient received 25 mg meclizine, 125 mg Solu-Medrol, 25 mg Phenergan, 1 L normal saline in ED. hospital Medicine consulted to admit patient for dizziness/unsteady gait patient in agreement with treatment plan.  Patient will be admitted under observation status for PT/OT eval for vestibular evaluation.    PCP: No primary care provider on file.    * No surgery found *      Hospital Course:   Kamar Loya is a 63 year old male with history of vertigo who presented to ED with postural dizziness and related to N/V. CT head and MRI brain were negative  for acute findings. He cerumen impacted in ED relieved with mild improvement in symptoms. Patient received 25 mg meclizine, 125 mg Solu-Medrol, 25 mg Phenergan, 1 L normal saline without improvement in symptoms. He was seen by PT/TO with performed [place] Hallpike assessment which was positive. Carotid ultrasound negative. He tolerated Epley maneuver with PT/OT with persistent dizziness and nausea. Patient was not able to sit for one hour after treatment. Discussed with ENT who recommended adding trial of benzodiazepines and continued Epley. Will monitor additional night.     3/8/24  Patient dizziness has improved. Epley maneuver today and tolerating sitting up for one hour. He will continue clonazepam as needed for two days for vertigo symtpoms. He was asked to follow up with ENT in one week. He will discharge with family.      Goals of Care Treatment Preferences:  Code Status: Full Code      Consults:     No new Assessment & Plan notes have been filed under this hospital service since the last note was generated.  Service: Hospital Medicine    Final Active Diagnoses:    Diagnosis Date Noted POA    PRINCIPAL PROBLEM:  Dizziness [R42] 03/07/2024 Yes    Impacted cerumen of both ears [H61.23] 03/07/2024 Yes    Hypertension [I10] 03/07/2024 Yes    Hypothyroidism [E03.9] 03/07/2024 Yes      Problems Resolved During this Admission:       Discharged Condition: stable    Disposition: Home or Self Care    Follow Up:   Follow-up Information       [facility] Follow up.                           Patient Instructions:      Nursing communication   Order Comments: Patient will discharge this afternoon when family arrives.       Significant Diagnostic Studies: Labs: CMP   Recent Labs   Lab 03/08/24  0612      K 4.1      CO2 25   *   BUN 16   CREATININE 1.1   CALCIUM 8.8   ANIONGAP 7*    and CBC   Recent Labs   Lab 03/08/24  0612   WBC 6.95   HGB 14.7   HCT 43.0          Pending Diagnostic Studies:        None           Medications:  Reconciled Home Medications:      Medication List        START taking these medications      clonazePAM 0.5 MG tablet  Commonly known as: KlonoPIN  Take 1 tablet (0.5 mg total) by mouth 2 (two) times daily as needed (vertigo).     ondansetron 4 MG Tbdl  Commonly known as: ZOFRAN-ODT  Dissolve 2 tablets (8 mg total) by mouth every 6 (six) hours as needed (nausea).            CONTINUE taking these medications      albuterol 2.5 mg /3 mL (0.083 %) nebulizer solution  Commonly known as: PROVENTIL  Inhale 3 mL 3 times a day by nebulization route as needed.     atorvastatin 20 MG tablet  Commonly known as: LIPITOR  TAKE ONE TABLET BY MOUTH DAILY AT BEDTIME FOR CHOLESTEROL     losartan 50 MG tablet  Commonly known as: COZAAR     SYNTHROID 150 MCG tablet  Generic drug: levothyroxine  Take 1 tablet every day by oral route in the morning.              Indwelling Lines/Drains at time of discharge:   Lines/Drains/Airways       None                   Time spent on the discharge of patient: >35 minutes         Luca Haynes NP  Department of Hospital Medicine  [facility]

## 2024-03-10 LAB
OHS QRS DURATION: 98 MS
OHS QTC CALCULATION: 448 MS